# Patient Record
Sex: FEMALE | Race: WHITE | NOT HISPANIC OR LATINO
[De-identification: names, ages, dates, MRNs, and addresses within clinical notes are randomized per-mention and may not be internally consistent; named-entity substitution may affect disease eponyms.]

---

## 2019-01-15 ENCOUNTER — MEDICATION RENEWAL (OUTPATIENT)
Age: 79
End: 2019-01-15

## 2019-03-08 ENCOUNTER — RX RENEWAL (OUTPATIENT)
Age: 79
End: 2019-03-08

## 2019-06-04 ENCOUNTER — RECORD ABSTRACTING (OUTPATIENT)
Age: 79
End: 2019-06-04

## 2019-06-04 ENCOUNTER — LABORATORY RESULT (OUTPATIENT)
Age: 79
End: 2019-06-04

## 2019-06-04 ENCOUNTER — APPOINTMENT (OUTPATIENT)
Dept: INTERNAL MEDICINE | Facility: CLINIC | Age: 79
End: 2019-06-04
Payer: MEDICARE

## 2019-06-04 ENCOUNTER — NON-APPOINTMENT (OUTPATIENT)
Age: 79
End: 2019-06-04

## 2019-06-04 VITALS
SYSTOLIC BLOOD PRESSURE: 122 MMHG | WEIGHT: 130 LBS | BODY MASS INDEX: 27.29 KG/M2 | DIASTOLIC BLOOD PRESSURE: 77 MMHG | HEART RATE: 65 BPM | HEIGHT: 58 IN

## 2019-06-04 DIAGNOSIS — H92.09 OTALGIA, UNSPECIFIED EAR: ICD-10-CM

## 2019-06-04 DIAGNOSIS — T78.40XA ALLERGY, UNSPECIFIED, INITIAL ENCOUNTER: ICD-10-CM

## 2019-06-04 DIAGNOSIS — H61.20 IMPACTED CERUMEN, UNSPECIFIED EAR: ICD-10-CM

## 2019-06-04 DIAGNOSIS — Z82.61 FAMILY HISTORY OF ARTHRITIS: ICD-10-CM

## 2019-06-04 DIAGNOSIS — F42.9 OBSESSIVE-COMPULSIVE DISORDER, UNSPECIFIED: ICD-10-CM

## 2019-06-04 DIAGNOSIS — L28.2 OTHER PRURIGO: ICD-10-CM

## 2019-06-04 DIAGNOSIS — I34.0 NONRHEUMATIC MITRAL (VALVE) INSUFFICIENCY: ICD-10-CM

## 2019-06-04 DIAGNOSIS — Z92.89 PERSONAL HISTORY OF OTHER MEDICAL TREATMENT: ICD-10-CM

## 2019-06-04 DIAGNOSIS — Z79.899 OTHER LONG TERM (CURRENT) DRUG THERAPY: ICD-10-CM

## 2019-06-04 LAB
BILIRUB UR QL STRIP: NORMAL
CLARITY UR: CLEAR
COLLECTION METHOD: NORMAL
GLUCOSE UR-MCNC: NORMAL
HCG UR QL: 0.5 EU/DL
HGB UR QL STRIP.AUTO: NORMAL
KETONES UR-MCNC: NORMAL
LEUKOCYTE ESTERASE UR QL STRIP: NORMAL
NITRITE UR QL STRIP: NORMAL
PH UR STRIP: 6.5
PROT UR STRIP-MCNC: NORMAL
SP GR UR STRIP: 1.02

## 2019-06-04 PROCEDURE — 81003 URINALYSIS AUTO W/O SCOPE: CPT | Mod: QW

## 2019-06-04 PROCEDURE — 36415 COLL VENOUS BLD VENIPUNCTURE: CPT

## 2019-06-04 PROCEDURE — G0439: CPT

## 2019-06-04 PROCEDURE — 93000 ELECTROCARDIOGRAM COMPLETE: CPT

## 2019-06-04 RX ORDER — MULTIVITAMIN/IRON/FOLIC ACID 18MG-0.4MG
600-400 TABLET ORAL
Refills: 0 | Status: ACTIVE | COMMUNITY

## 2019-06-04 NOTE — COUNSELING
[Weight management counseling provided] : Weight management [Healthy eating counseling provided] : healthy eating [Activity counseling provided] : activity [de-identified] : patient counseled on    diet   importance of exercise and not smoking  regular dental care and periodic eye exams.  timing of when will be due for colonoscopy   discussed  regular  mammography and  regular gyn visits discussed\par

## 2019-06-04 NOTE — HEALTH RISK ASSESSMENT
[None] : None [Fully functional (using the telephone, shopping, preparing meals, housekeeping, doing laundry, using] : Fully functional and needs no help or supervision to perform IADLs (using the telephone, shopping, preparing meals, housekeeping, doing laundry, using transportation, managing medications and managing finances) [Fully functional (bathing, dressing, toileting, transferring, walking, feeding)] : Fully functional (bathing, dressing, toileting, transferring, walking, feeding) [No falls in past year] : Patient reported no falls in the past year [0] : 2) Feeling down, depressed, or hopeless: Not at all (0) [] : No [de-identified] : walks a lot [Reports changes in hearing] : Reports no changes in hearing [Reports changes in vision] : Reports no changes in vision [Reports changes in dental health] : Reports no changes in dental health [PapSmearDate] : 2018 [MammogramDate] : 2019 [BoneDensityDate] : 2019

## 2019-06-05 LAB
ALBUMIN SERPL ELPH-MCNC: 4.6 G/DL
ALP BLD-CCNC: 60 U/L
ALT SERPL-CCNC: 11 U/L
ANION GAP SERPL CALC-SCNC: 12 MMOL/L
AST SERPL-CCNC: 24 U/L
BASOPHILS # BLD AUTO: 0.17 K/UL
BASOPHILS NFR BLD AUTO: 1.7 %
BILIRUB SERPL-MCNC: 0.3 MG/DL
BUN SERPL-MCNC: 21 MG/DL
CALCIUM SERPL-MCNC: 9.8 MG/DL
CHLORIDE SERPL-SCNC: 105 MMOL/L
CHOLEST SERPL-MCNC: 163 MG/DL
CHOLEST/HDLC SERPL: 2.3 RATIO
CO2 SERPL-SCNC: 26 MMOL/L
CREAT SERPL-MCNC: 1 MG/DL
EOSINOPHIL # BLD AUTO: 0.26 K/UL
EOSINOPHIL NFR BLD AUTO: 2.6 %
ESTIMATED AVERAGE GLUCOSE: 128 MG/DL
GLUCOSE SERPL-MCNC: 94 MG/DL
HBA1C MFR BLD HPLC: 6.1 %
HCT VFR BLD CALC: 41 %
HDLC SERPL-MCNC: 71 MG/DL
HGB BLD-MCNC: 12.2 G/DL
LDLC SERPL CALC-MCNC: 74 MG/DL
LYMPHOCYTES # BLD AUTO: 6.17 K/UL
LYMPHOCYTES NFR BLD AUTO: 61.7 %
MAN DIFF?: NORMAL
MCHC RBC-ENTMCNC: 26.5 PG
MCHC RBC-ENTMCNC: 29.8 GM/DL
MCV RBC AUTO: 89.1 FL
MONOCYTES # BLD AUTO: 0.44 K/UL
MONOCYTES NFR BLD AUTO: 4.4 %
NEUTROPHILS # BLD AUTO: 2.96 K/UL
NEUTROPHILS NFR BLD AUTO: 29.6 %
PLATELET # BLD AUTO: 206 K/UL
POTASSIUM SERPL-SCNC: 5.4 MMOL/L
PROT SERPL-MCNC: 6.8 G/DL
RBC # BLD: 4.6 M/UL
RBC # FLD: 12.3 %
SODIUM SERPL-SCNC: 143 MMOL/L
TRIGL SERPL-MCNC: 90 MG/DL
WBC # FLD AUTO: 10 K/UL

## 2019-06-07 ENCOUNTER — APPOINTMENT (OUTPATIENT)
Dept: INTERNAL MEDICINE | Facility: CLINIC | Age: 79
End: 2019-06-07
Payer: MEDICARE

## 2019-06-07 LAB
DATE COLLECTED: NORMAL
HEMOCCULT SP1 STL QL: NEGATIVE
QUALITY CONTROL: YES

## 2019-06-07 PROCEDURE — 82270 OCCULT BLOOD FECES: CPT

## 2019-08-09 ENCOUNTER — MEDICATION RENEWAL (OUTPATIENT)
Age: 79
End: 2019-08-09

## 2019-11-15 ENCOUNTER — MEDICATION RENEWAL (OUTPATIENT)
Age: 79
End: 2019-11-15

## 2019-11-20 ENCOUNTER — RX RENEWAL (OUTPATIENT)
Age: 79
End: 2019-11-20

## 2020-04-07 ENCOUNTER — APPOINTMENT (OUTPATIENT)
Dept: INTERNAL MEDICINE | Facility: CLINIC | Age: 80
End: 2020-04-07
Payer: MEDICARE

## 2020-04-07 DIAGNOSIS — R21 RASH AND OTHER NONSPECIFIC SKIN ERUPTION: ICD-10-CM

## 2020-04-07 PROCEDURE — G2012 BRIEF CHECK IN BY MD/QHP: CPT

## 2020-04-07 RX ORDER — CLOTRIMAZOLE AND BETAMETHASONE DIPROPIONATE 10; .5 MG/G; MG/G
1-0.05 CREAM TOPICAL TWICE DAILY
Qty: 1 | Refills: 0 | Status: ACTIVE | COMMUNITY
Start: 2020-04-07 | End: 1900-01-01

## 2020-06-16 ENCOUNTER — APPOINTMENT (OUTPATIENT)
Dept: INTERNAL MEDICINE | Facility: CLINIC | Age: 80
End: 2020-06-16
Payer: MEDICARE

## 2020-06-16 ENCOUNTER — NON-APPOINTMENT (OUTPATIENT)
Age: 80
End: 2020-06-16

## 2020-06-16 VITALS
HEIGHT: 58 IN | DIASTOLIC BLOOD PRESSURE: 75 MMHG | WEIGHT: 134 LBS | OXYGEN SATURATION: 98 % | HEART RATE: 74 BPM | SYSTOLIC BLOOD PRESSURE: 136 MMHG | BODY MASS INDEX: 28.13 KG/M2

## 2020-06-16 DIAGNOSIS — R01.1 CARDIAC MURMUR, UNSPECIFIED: ICD-10-CM

## 2020-06-16 DIAGNOSIS — Z00.00 ENCOUNTER FOR GENERAL ADULT MEDICAL EXAMINATION W/OUT ABNORMAL FINDINGS: ICD-10-CM

## 2020-06-16 LAB
BILIRUB UR QL STRIP: NORMAL
CLARITY UR: CLEAR
COLLECTION METHOD: NORMAL
GLUCOSE UR-MCNC: NORMAL
HCG UR QL: 0.2 EU/DL
HGB UR QL STRIP.AUTO: NORMAL
KETONES UR-MCNC: NORMAL
LEUKOCYTE ESTERASE UR QL STRIP: NORMAL
NITRITE UR QL STRIP: NORMAL
PH UR STRIP: 6
PROT UR STRIP-MCNC: NORMAL
SP GR UR STRIP: 1.02

## 2020-06-16 PROCEDURE — 93000 ELECTROCARDIOGRAM COMPLETE: CPT | Mod: 59

## 2020-06-16 PROCEDURE — 81003 URINALYSIS AUTO W/O SCOPE: CPT | Mod: QW

## 2020-06-16 PROCEDURE — G0439: CPT

## 2020-06-16 PROCEDURE — G0444 DEPRESSION SCREEN ANNUAL: CPT

## 2020-06-16 PROCEDURE — 36415 COLL VENOUS BLD VENIPUNCTURE: CPT

## 2020-06-16 RX ORDER — PREDNISONE 20 MG/1
20 TABLET ORAL DAILY
Qty: 10 | Refills: 0 | Status: DISCONTINUED | COMMUNITY
Start: 2020-04-07 | End: 2020-06-16

## 2020-06-16 NOTE — HEALTH RISK ASSESSMENT
[Good] : ~his/her~  mood as  good [No] : No [No falls in past year] : Patient reported no falls in the past year [0] : 1) Little interest or pleasure doing things: Not at all (0) [Patient reported mammogram was normal] : Patient reported mammogram was normal [Patient reported PAP Smear was normal] : Patient reported PAP Smear was normal [] : No [MammogramDate] : 2019 [PapSmearDate] : 2019

## 2020-06-17 LAB
ALBUMIN SERPL ELPH-MCNC: 4.6 G/DL
ALP BLD-CCNC: 59 U/L
ALT SERPL-CCNC: 14 U/L
ANION GAP SERPL CALC-SCNC: 14 MMOL/L
AST SERPL-CCNC: 26 U/L
BASOPHILS # BLD AUTO: 0.13 K/UL
BASOPHILS NFR BLD AUTO: 1.4 %
BILIRUB SERPL-MCNC: 0.3 MG/DL
BUN SERPL-MCNC: 16 MG/DL
CALCIUM SERPL-MCNC: 9.8 MG/DL
CHLORIDE SERPL-SCNC: 102 MMOL/L
CHOLEST SERPL-MCNC: 178 MG/DL
CHOLEST/HDLC SERPL: 2.5 RATIO
CO2 SERPL-SCNC: 25 MMOL/L
CREAT SERPL-MCNC: 0.92 MG/DL
EOSINOPHIL # BLD AUTO: 0.24 K/UL
EOSINOPHIL NFR BLD AUTO: 2.6 %
ESTIMATED AVERAGE GLUCOSE: 131 MG/DL
GLUCOSE SERPL-MCNC: 100 MG/DL
HBA1C MFR BLD HPLC: 6.2 %
HCT VFR BLD CALC: 41 %
HDLC SERPL-MCNC: 72 MG/DL
HGB BLD-MCNC: 12.4 G/DL
IMM GRANULOCYTES NFR BLD AUTO: 0.3 %
LDLC SERPL CALC-MCNC: 79 MG/DL
LYMPHOCYTES # BLD AUTO: 5.38 K/UL
LYMPHOCYTES NFR BLD AUTO: 57.2 %
MAN DIFF?: NORMAL
MCHC RBC-ENTMCNC: 26.3 PG
MCHC RBC-ENTMCNC: 30.2 GM/DL
MCV RBC AUTO: 86.9 FL
MONOCYTES # BLD AUTO: 0.59 K/UL
MONOCYTES NFR BLD AUTO: 6.3 %
NEUTROPHILS # BLD AUTO: 3.03 K/UL
NEUTROPHILS NFR BLD AUTO: 32.2 %
PLATELET # BLD AUTO: 200 K/UL
POTASSIUM SERPL-SCNC: 4.7 MMOL/L
PROT SERPL-MCNC: 6.8 G/DL
RBC # BLD: 4.72 M/UL
RBC # FLD: 13 %
SODIUM SERPL-SCNC: 141 MMOL/L
TRIGL SERPL-MCNC: 129 MG/DL
WBC # FLD AUTO: 9.4 K/UL

## 2020-06-19 DIAGNOSIS — M79.652 PAIN IN LEFT THIGH: ICD-10-CM

## 2020-06-21 ENCOUNTER — RESULT CHARGE (OUTPATIENT)
Age: 80
End: 2020-06-21

## 2020-06-22 LAB
HEMOCCULT 2: NEGATIVE
HEMOCCULT 3: NEGATIVE
HEMOCCULT SP1 STL QL: NEGATIVE
QUALITY CONTROL: YES

## 2020-06-23 ENCOUNTER — APPOINTMENT (OUTPATIENT)
Dept: INTERNAL MEDICINE | Facility: CLINIC | Age: 80
End: 2020-06-23
Payer: MEDICARE

## 2020-06-23 VITALS
DIASTOLIC BLOOD PRESSURE: 78 MMHG | WEIGHT: 137 LBS | SYSTOLIC BLOOD PRESSURE: 132 MMHG | OXYGEN SATURATION: 98 % | TEMPERATURE: 98.3 F | BODY MASS INDEX: 28.63 KG/M2 | HEART RATE: 72 BPM

## 2020-06-23 DIAGNOSIS — R60.0 LOCALIZED EDEMA: ICD-10-CM

## 2020-06-23 PROCEDURE — 99214 OFFICE O/P EST MOD 30 MIN: CPT

## 2020-06-23 NOTE — PHYSICAL EXAM
[Normal Sclera/Conjunctiva] : normal sclera/conjunctiva [Normal] : no acute distress, well nourished, well developed and well-appearing [Normal Outer Ear/Nose] : the outer ears and nose were normal in appearance [No Respiratory Distress] : no respiratory distress  [No JVD] : no jugular venous distention [No Accessory Muscle Use] : no accessory muscle use [Clear to Auscultation] : lungs were clear to auscultation bilaterally [Normal Rate] : normal rate  [Normal S1, S2] : normal S1 and S2 [Regular Rhythm] : with a regular rhythm [Pedal Pulses Present] : the pedal pulses are present [de-identified] : as aabove [de-identified] : skin over feet is normal and not cold

## 2020-06-23 NOTE — HISTORY OF PRESENT ILLNESS
[FreeTextEntry1] : 4 days ago she twisted her torso getting out of bed and has severe pain in right lower leg and up into thigh and buttock. she go no sig relief from precocet and saw orthopedics who gave her a medrol pack but told her x rays were not revealing.  she has also noticed over the  last  few days that her ankle are puffy.  she denies any change in salt intak and ha no calf pain on either side  sh deneis sob or cp  shehas no recent travel or immobilization

## 2021-01-31 ENCOUNTER — RX RENEWAL (OUTPATIENT)
Age: 81
End: 2021-01-31

## 2021-05-05 ENCOUNTER — APPOINTMENT (OUTPATIENT)
Dept: INTERNAL MEDICINE | Facility: CLINIC | Age: 81
End: 2021-05-05
Payer: MEDICARE

## 2021-05-05 VITALS
TEMPERATURE: 97.6 F | BODY MASS INDEX: 29.26 KG/M2 | OXYGEN SATURATION: 98 % | WEIGHT: 140 LBS | DIASTOLIC BLOOD PRESSURE: 88 MMHG | HEART RATE: 70 BPM | SYSTOLIC BLOOD PRESSURE: 118 MMHG

## 2021-05-05 PROCEDURE — 99213 OFFICE O/P EST LOW 20 MIN: CPT

## 2021-05-05 PROCEDURE — 99072 ADDL SUPL MATRL&STAF TM PHE: CPT

## 2021-05-05 NOTE — HISTORY OF PRESENT ILLNESS
[FreeTextEntry1] : post hand surgery  high bp noted to  be high.  she says she feels fine  she is on statin  with no side effects  she had her vaccine she says she is trying  to watch her diet re her sugar

## 2021-05-05 NOTE — PHYSICAL EXAM
[Normal] : no acute distress, well nourished, well developed and well-appearing [Normal Sclera/Conjunctiva] : normal sclera/conjunctiva [No JVD] : no jugular venous distention [Supple] : supple [No Respiratory Distress] : no respiratory distress  [No Accessory Muscle Use] : no accessory muscle use [Clear to Auscultation] : lungs were clear to auscultation bilaterally [Normal Rate] : normal rate  [Regular Rhythm] : with a regular rhythm [Normal S1, S2] : normal S1 and S2

## 2021-06-18 ENCOUNTER — APPOINTMENT (OUTPATIENT)
Dept: INTERNAL MEDICINE | Facility: CLINIC | Age: 81
End: 2021-06-18
Payer: MEDICARE

## 2021-06-18 ENCOUNTER — NON-APPOINTMENT (OUTPATIENT)
Age: 81
End: 2021-06-18

## 2021-06-18 VITALS
HEIGHT: 58 IN | DIASTOLIC BLOOD PRESSURE: 68 MMHG | BODY MASS INDEX: 28.97 KG/M2 | OXYGEN SATURATION: 9 % | HEART RATE: 72 BPM | TEMPERATURE: 97.8 F | WEIGHT: 138 LBS | SYSTOLIC BLOOD PRESSURE: 130 MMHG

## 2021-06-18 DIAGNOSIS — R94.31 ABNORMAL ELECTROCARDIOGRAM [ECG] [EKG]: ICD-10-CM

## 2021-06-18 DIAGNOSIS — R31.29 OTHER MICROSCOPIC HEMATURIA: ICD-10-CM

## 2021-06-18 DIAGNOSIS — Z51.81 ENCOUNTER FOR THERAPEUTIC DRUG LVL MONITORING: ICD-10-CM

## 2021-06-18 LAB
BILIRUB UR QL STRIP: NORMAL
CLARITY UR: CLEAR
COLLECTION METHOD: NORMAL
GLUCOSE UR-MCNC: NORMAL
HCG UR QL: 0.2 EU/DL
HGB UR QL STRIP.AUTO: NORMAL
KETONES UR-MCNC: NORMAL
LEUKOCYTE ESTERASE UR QL STRIP: NORMAL
PH UR STRIP: 6
PROT UR STRIP-MCNC: NORMAL
SP GR UR STRIP: 1.02

## 2021-06-18 PROCEDURE — 93000 ELECTROCARDIOGRAM COMPLETE: CPT

## 2021-06-18 PROCEDURE — 36415 COLL VENOUS BLD VENIPUNCTURE: CPT

## 2021-06-18 PROCEDURE — 81003 URINALYSIS AUTO W/O SCOPE: CPT | Mod: QW

## 2021-06-18 PROCEDURE — G0439: CPT

## 2021-06-18 NOTE — HEALTH RISK ASSESSMENT
[Patient reported mammogram was normal] : Patient reported mammogram was normal [Patient reported PAP Smear was normal] : Patient reported PAP Smear was normal [MammogramDate] : 2020 [PapSmearDate] : 2020

## 2021-06-23 ENCOUNTER — MED ADMIN CHARGE (OUTPATIENT)
Age: 81
End: 2021-06-23

## 2021-06-23 ENCOUNTER — APPOINTMENT (OUTPATIENT)
Dept: CARDIOLOGY | Facility: CLINIC | Age: 81
End: 2021-06-23
Payer: MEDICARE

## 2021-06-23 PROCEDURE — A9500: CPT

## 2021-06-23 PROCEDURE — 93015 CV STRESS TEST SUPVJ I&R: CPT

## 2021-06-23 PROCEDURE — 78452 HT MUSCLE IMAGE SPECT MULT: CPT

## 2021-06-23 RX ORDER — REGADENOSON 0.08 MG/ML
0.4 INJECTION, SOLUTION INTRAVENOUS
Qty: 1 | Refills: 0 | Status: COMPLETED | OUTPATIENT
Start: 2021-06-23

## 2021-06-23 RX ADMIN — REGADENOSON 4 MG/5ML: 0.08 INJECTION, SOLUTION INTRAVENOUS at 00:00

## 2021-06-24 ENCOUNTER — RESULT CHARGE (OUTPATIENT)
Age: 81
End: 2021-06-24

## 2021-06-24 LAB
ALBUMIN SERPL ELPH-MCNC: 4.7 G/DL
ALP BLD-CCNC: 71 U/L
ALT SERPL-CCNC: 28 U/L
ANION GAP SERPL CALC-SCNC: 11 MMOL/L
APPEARANCE: CLEAR
AST SERPL-CCNC: 38 U/L
BACTERIA: NEGATIVE
BASOPHILS # BLD AUTO: 0.06 K/UL
BASOPHILS NFR BLD AUTO: 0.3 %
BILIRUB SERPL-MCNC: 0.2 MG/DL
BILIRUBIN URINE: NEGATIVE
BLOOD URINE: NEGATIVE
BUN SERPL-MCNC: 21 MG/DL
CALCIUM SERPL-MCNC: 10.6 MG/DL
CHLORIDE SERPL-SCNC: 106 MMOL/L
CHOLEST SERPL-MCNC: 220 MG/DL
CO2 SERPL-SCNC: 24 MMOL/L
COLOR: COLORLESS
CREAT SERPL-MCNC: 0.91 MG/DL
DATE COLLECTED: NORMAL
EOSINOPHIL # BLD AUTO: 0.17 K/UL
EOSINOPHIL NFR BLD AUTO: 1 %
ESTIMATED AVERAGE GLUCOSE: 140 MG/DL
GLUCOSE QUALITATIVE U: NEGATIVE
GLUCOSE SERPL-MCNC: 128 MG/DL
HBA1C MFR BLD HPLC: 6.5 %
HCT VFR BLD CALC: 39.2 %
HDLC SERPL-MCNC: 76 MG/DL
HEMOCCULT SP1 STL QL: NEGATIVE
HGB BLD-MCNC: 11.8 G/DL
HYALINE CASTS: 0 /LPF
IMM GRANULOCYTES NFR BLD AUTO: 0.4 %
KETONES URINE: NEGATIVE
LDLC SERPL CALC-MCNC: 130 MG/DL
LEUKOCYTE ESTERASE URINE: NEGATIVE
LYMPHOCYTES # BLD AUTO: 4.62 K/UL
LYMPHOCYTES NFR BLD AUTO: 26.9 %
MAN DIFF?: NORMAL
MCHC RBC-ENTMCNC: 27.1 PG
MCHC RBC-ENTMCNC: 30.1 GM/DL
MCV RBC AUTO: 90.1 FL
MICROSCOPIC-UA: NORMAL
MONOCYTES # BLD AUTO: 0.94 K/UL
MONOCYTES NFR BLD AUTO: 5.5 %
NEUTROPHILS # BLD AUTO: 11.29 K/UL
NEUTROPHILS NFR BLD AUTO: 65.9 %
NITRITE URINE: NEGATIVE
NONHDLC SERPL-MCNC: 145 MG/DL
PH URINE: 7.5
PLATELET # BLD AUTO: 264 K/UL
POTASSIUM SERPL-SCNC: 5.1 MMOL/L
PROT SERPL-MCNC: 6.8 G/DL
PROTEIN URINE: NEGATIVE
QUALITY CONTROL: YES
RBC # BLD: 4.35 M/UL
RBC # FLD: 13.8 %
RED BLOOD CELLS URINE: 2 /HPF
SODIUM SERPL-SCNC: 140 MMOL/L
SPECIFIC GRAVITY URINE: 1.01
SQUAMOUS EPITHELIAL CELLS: 0 /HPF
TRIGL SERPL-MCNC: 72 MG/DL
UROBILINOGEN URINE: NORMAL
WBC # FLD AUTO: 17.15 K/UL
WHITE BLOOD CELLS URINE: 0 /HPF

## 2021-07-26 ENCOUNTER — RX RENEWAL (OUTPATIENT)
Age: 81
End: 2021-07-26

## 2021-10-23 DIAGNOSIS — Z01.818 ENCOUNTER FOR OTHER PREPROCEDURAL EXAMINATION: ICD-10-CM

## 2021-10-25 ENCOUNTER — APPOINTMENT (OUTPATIENT)
Dept: DISASTER EMERGENCY | Facility: CLINIC | Age: 81
End: 2021-10-25

## 2021-10-26 LAB — SARS-COV-2 N GENE NPH QL NAA+PROBE: NOT DETECTED

## 2021-11-16 ENCOUNTER — LABORATORY RESULT (OUTPATIENT)
Age: 81
End: 2021-11-16

## 2021-11-16 ENCOUNTER — APPOINTMENT (OUTPATIENT)
Dept: INTERNAL MEDICINE | Facility: CLINIC | Age: 81
End: 2021-11-16
Payer: MEDICARE

## 2021-11-16 VITALS
TEMPERATURE: 97.8 F | WEIGHT: 133 LBS | HEART RATE: 73 BPM | OXYGEN SATURATION: 98 % | DIASTOLIC BLOOD PRESSURE: 73 MMHG | HEIGHT: 58 IN | SYSTOLIC BLOOD PRESSURE: 151 MMHG | BODY MASS INDEX: 27.92 KG/M2

## 2021-11-16 VITALS — SYSTOLIC BLOOD PRESSURE: 124 MMHG | DIASTOLIC BLOOD PRESSURE: 80 MMHG

## 2021-11-16 DIAGNOSIS — D72.829 ELEVATED WHITE BLOOD CELL COUNT, UNSPECIFIED: ICD-10-CM

## 2021-11-16 LAB
ALBUMIN SERPL ELPH-MCNC: 4.4 G/DL
ALP BLD-CCNC: 59 U/L
ALT SERPL-CCNC: 10 U/L
ANION GAP SERPL CALC-SCNC: 13 MMOL/L
AST SERPL-CCNC: 17 U/L
BILIRUB SERPL-MCNC: 0.3 MG/DL
BUN SERPL-MCNC: 22 MG/DL
CALCIUM SERPL-MCNC: 9.6 MG/DL
CHLORIDE SERPL-SCNC: 106 MMOL/L
CHOLEST SERPL-MCNC: 173 MG/DL
CO2 SERPL-SCNC: 23 MMOL/L
CREAT SERPL-MCNC: 0.86 MG/DL
ESTIMATED AVERAGE GLUCOSE: 131 MG/DL
GLUCOSE SERPL-MCNC: 94 MG/DL
HBA1C MFR BLD HPLC: 6.2 %
HDLC SERPL-MCNC: 88 MG/DL
LDLC SERPL CALC-MCNC: 73 MG/DL
NONHDLC SERPL-MCNC: 85 MG/DL
POTASSIUM SERPL-SCNC: 4.7 MMOL/L
PROT SERPL-MCNC: 6.2 G/DL
SODIUM SERPL-SCNC: 142 MMOL/L
TRIGL SERPL-MCNC: 60 MG/DL

## 2021-11-16 PROCEDURE — 99214 OFFICE O/P EST MOD 30 MIN: CPT

## 2021-11-16 NOTE — HISTORY OF PRESENT ILLNESS
[FreeTextEntry1] : a lot of stress  she says her bp has been up and down. she is on rx only for hld  had  high a1c last time and has continued to get the steroid injections and her diet  re sugar has been   variable due to the stress.

## 2021-11-16 NOTE — PHYSICAL EXAM
[Normal] : no acute distress, well nourished, well developed and well-appearing [Normal Sclera/Conjunctiva] : normal sclera/conjunctiva [Normal Outer Ear/Nose] : the outer ears and nose were normal in appearance [No JVD] : no jugular venous distention [No Respiratory Distress] : no respiratory distress  [No Accessory Muscle Use] : no accessory muscle use [Clear to Auscultation] : lungs were clear to auscultation bilaterally [Normal Rate] : normal rate  [Regular Rhythm] : with a regular rhythm [Normal S1, S2] : normal S1 and S2 [No Edema] : there was no peripheral edema [Soft] : abdomen soft [Non Tender] : non-tender [No HSM] : no HSM [Normal Bowel Sounds] : normal bowel sounds

## 2021-11-17 LAB
BASOPHILS # BLD AUTO: 0.19 K/UL
BASOPHILS NFR BLD AUTO: 1.7 %
EOSINOPHIL # BLD AUTO: 0.76 K/UL
EOSINOPHIL NFR BLD AUTO: 7 %
HCT VFR BLD CALC: 40.8 %
HGB BLD-MCNC: 12.5 G/DL
LYMPHOCYTES # BLD AUTO: 5.78 K/UL
LYMPHOCYTES NFR BLD AUTO: 53 %
MAN DIFF?: NORMAL
MCHC RBC-ENTMCNC: 27 PG
MCHC RBC-ENTMCNC: 30.6 GM/DL
MCV RBC AUTO: 88.1 FL
MONOCYTES # BLD AUTO: 0.48 K/UL
MONOCYTES NFR BLD AUTO: 4.4 %
NEUTROPHILS # BLD AUTO: 3.7 K/UL
NEUTROPHILS NFR BLD AUTO: 33.9 %
PLATELET # BLD AUTO: 230 K/UL
RBC # BLD: 4.63 M/UL
RBC # FLD: 13.2 %
WBC # FLD AUTO: 10.91 K/UL

## 2021-12-15 ENCOUNTER — TRANSCRIPTION ENCOUNTER (OUTPATIENT)
Age: 81
End: 2021-12-15

## 2022-01-01 ENCOUNTER — TRANSCRIPTION ENCOUNTER (OUTPATIENT)
Age: 82
End: 2022-01-01

## 2022-01-31 ENCOUNTER — TRANSCRIPTION ENCOUNTER (OUTPATIENT)
Age: 82
End: 2022-01-31

## 2022-04-20 ENCOUNTER — NON-APPOINTMENT (OUTPATIENT)
Age: 82
End: 2022-04-20

## 2022-04-22 ENCOUNTER — APPOINTMENT (OUTPATIENT)
Dept: PAIN MANAGEMENT | Facility: CLINIC | Age: 82
End: 2022-04-22
Payer: MEDICARE

## 2022-04-22 VITALS — BODY MASS INDEX: 29.39 KG/M2 | WEIGHT: 140 LBS | HEIGHT: 58 IN

## 2022-04-22 PROCEDURE — 99214 OFFICE O/P EST MOD 30 MIN: CPT

## 2022-04-22 NOTE — ASSESSMENT
[FreeTextEntry1] : After discussing various treatment options with the patient including but not limited to oral medications, physical therapy, exercise, modalities as well as interventional spinal injections, we have decided with the following plan:\par \par I would recommend a trial of neuropathic medication as patient presents with signs of nerve irritation. (ie burning, paresthesias etc) Goals of therapy would be to improve pain and overall QOL. Side effects reviewed with patient. Patient will call or stop medication if given side effects occur.\par \par Continue active and healthy lifestyle. Encourage ambulation minimum of 30 minutes per day.\par

## 2022-04-22 NOTE — PHYSICAL EXAM
[Normal Mood and Affect] : normal mood and affect [Orientated] : orientated [Able to Communicate] : able to communicate [No Respiratory Distress] : no respiratory distress [] : diminished ROM in all planes [4___] : right hip flexion 4[unfilled]/5 [FreeTextEntry8] : very TTP

## 2022-04-22 NOTE — HISTORY OF PRESENT ILLNESS
[Lower back] : lower back [10] : 10 [7] : 7 [Sharp] : sharp [Shooting] : shooting [Tingling] : tingling [Constant] : constant [Nothing helps with pain getting better] : Nothing helps with pain getting better [Sitting] : sitting [Standing] : standing [Walking] : walking [Bending forward] : bending forward [Stairs] : stairs [FreeTextEntry1] : 04/22/2022: follow up today. She is having increased pain i the lower back with radiation down the legs. Allergic to  aspirin. Medrol in January gave her mild relief. Got nauseous from tramadol \par \par 2/18/22: follow up today after b/l L5/S1 on 2/3/22. Had 80% relief. Had 4 injections in 1 year so will have to wait until June.\par \par 12/31/21: follow up today. Pain in the lower back with tingling down the bilateral legs. She is going away next week. She had a dull ache last month and the pain got worse.\par \par 11/12/21- Patient had 60% relief from TFESI. Does not want medication. Has 1 injection left until next June.\par \par 8/27/21- patient had 90% improvement from GABBI.\par \par 7/2/21: follow up today after b/l L5/S1 TFESI on 6/17/21. she reports >90% relief following.\par \par 6/4/21- Patient states last June she jumped out of bed and felt pain right away. She had seen Dr. Cox who\par recommended TFESI. She felt better without injection. Pain is in b/l low back radiating down both legs. No PT and just taking Tylenol. Gets n/t down the posterior legs. Tato any weakness in the legs. She reports pain down the posterior bilateral legs to the ankles.\par MRI lumbar spine (5/21/20) T12/L1 moderate to severe spondylosis, L1/2 moderate b/l NF stenosis, L3/4 severe FA and severe central stenosis, L4/5 severe b/l FA, L5/S1 severe left FA. [] : no [FreeTextEntry7] : both legs

## 2022-04-24 ENCOUNTER — NON-APPOINTMENT (OUTPATIENT)
Age: 82
End: 2022-04-24

## 2022-05-18 ENCOUNTER — NON-APPOINTMENT (OUTPATIENT)
Age: 82
End: 2022-05-18

## 2022-05-19 DIAGNOSIS — Z11.59 ENCOUNTER FOR SCREENING FOR OTHER VIRAL DISEASES: ICD-10-CM

## 2022-05-24 ENCOUNTER — NON-APPOINTMENT (OUTPATIENT)
Age: 82
End: 2022-05-24

## 2022-06-03 ENCOUNTER — APPOINTMENT (OUTPATIENT)
Dept: PAIN MANAGEMENT | Facility: CLINIC | Age: 82
End: 2022-06-03
Payer: MEDICARE

## 2022-06-03 VITALS — BODY MASS INDEX: 28.97 KG/M2 | HEIGHT: 58 IN | WEIGHT: 138 LBS

## 2022-06-03 PROCEDURE — 99214 OFFICE O/P EST MOD 30 MIN: CPT

## 2022-06-14 NOTE — ASSESSMENT
[FreeTextEntry1] : \par Patient is presenting with acute/sub-acute radicular pain with impairment in ADLs and functionality.  The pain has not responded to  conservative care including nsaid therapy and/or physical therapy.  There is no bleeding tendency, unstable medical condition, or systemic infection.\par

## 2022-06-14 NOTE — HISTORY OF PRESENT ILLNESS
[Lower back] : lower back [10] : 10 [7] : 7 [Sharp] : sharp [Shooting] : shooting [Tingling] : tingling [Constant] : constant [Nothing helps with pain getting better] : Nothing helps with pain getting better [Sitting] : sitting [Standing] : standing [Walking] : walking [Bending forward] : bending forward [Stairs] : stairs [Stabbing] : stabbing [Household chores] : household chores [Retired] : Work status: retired [FreeTextEntry1] : 06/03/2022: follow up today.  She is off gabapentin due to symptoms.  She can have another injection after 6/17.  will schedule B/L L5-S1 TFESI.\par \par 04/22/2022: follow up today. She is having increased pain in the lower back with radiation down the legs. Allergic to  aspirin. Medrol in January gave her mild relief. Got nauseous from tramadol \par \par 2/18/22: follow up today after b/l L5/S1 on 2/3/22. Had 80% relief. Had 4 injections in 1 year so will have to wait until June.\par \par 12/31/21: follow up today. Pain in the lower back with tingling down the bilateral legs. She is going away next week. She had a dull ache last month and the pain got worse.\par \par 11/12/21- Patient had 60% relief from TFESI. Does not want medication. Has 1 injection left until next June.\par \par 8/27/21- patient had 90% improvement from GABBI.\par \par 7/2/21: follow up today after b/l L5/S1 TFESI on 6/17/21. she reports >90% relief following.\par \par 6/4/21- Patient states last June she jumped out of bed and felt pain right away. She had seen Dr. Cox who\par recommended TFESI. She felt better without injection. Pain is in b/l low back radiating down both legs. No PT and just taking Tylenol. Gets n/t down the posterior legs. Tato any weakness in the legs. She reports pain down the posterior bilateral legs to the ankles.\par \par MRI lumbar spine (5/21/20) T12/L1 moderate to severe spondylosis, L1/2 moderate b/l NF stenosis, L3/4 severe FA and severe central stenosis, L4/5 severe b/l FA, L5/S1 severe left FA. [] : no [FreeTextEntry7] : both legs  [de-identified] : mri l spine

## 2022-06-18 ENCOUNTER — NON-APPOINTMENT (OUTPATIENT)
Age: 82
End: 2022-06-18

## 2022-06-23 ENCOUNTER — APPOINTMENT (OUTPATIENT)
Age: 82
End: 2022-06-23
Payer: MEDICARE

## 2022-06-23 PROCEDURE — 64483 NJX AA&/STRD TFRM EPI L/S 1: CPT | Mod: 50

## 2022-07-08 ENCOUNTER — APPOINTMENT (OUTPATIENT)
Dept: PAIN MANAGEMENT | Facility: CLINIC | Age: 82
End: 2022-07-08

## 2022-07-08 VITALS — HEIGHT: 58 IN | WEIGHT: 139 LBS | BODY MASS INDEX: 29.18 KG/M2

## 2022-07-08 PROCEDURE — 99214 OFFICE O/P EST MOD 30 MIN: CPT

## 2022-07-08 NOTE — HISTORY OF PRESENT ILLNESS
[Lower back] : lower back [Sharp] : sharp [Shooting] : shooting [Tingling] : tingling [Constant] : constant [Household chores] : household chores [Nothing helps with pain getting better] : Nothing helps with pain getting better [Sitting] : sitting [Standing] : standing [Walking] : walking [Bending forward] : bending forward [Stairs] : stairs [Retired] : Work status: retired [Leisure] : leisure [10] : 10 [7] : 7 [FreeTextEntry1] : 04/22/2022: follow up today. She is having increased pain i the lower back with radiation down the legs. Allergic to  aspirin. Medrol in January gave her mild relief. Got nauseous from tramadol \par \par 2/18/22: follow up today after b/l L5/S1 on 2/3/22. Had 80% relief. Had 4 injections in 1 year so will have to wait until June.\par \par 12/31/21: follow up today. Pain in the lower back with tingling down the bilateral legs. She is going away next week. She had a dull ache last month and the pain got worse.\par \par 11/12/21- Patient had 60% relief from TFESI. Does not want medication. Has 1 injection left until next June.\par \par 8/27/21- patient had 90% improvement from GABBI.\par \par 7/2/21: follow up today after b/l L5/S1 TFESI on 6/17/21. she reports >90% relief following.\par \par 6/4/21- Patient states last June she jumped out of bed and felt pain right away. She had seen Dr. Cox who\par recommended TFESI. She felt better without injection. Pain is in b/l low back radiating down both legs. No PT and just taking Tylenol. Gets n/t down the posterior legs. Tato any weakness in the legs. She reports pain down the posterior bilateral legs to the ankles.\par MRI lumbar spine (5/21/20) T12/L1 moderate to severe spondylosis, L1/2 moderate b/l NF stenosis, L3/4 severe FA and severe central stenosis, L4/5 severe b/l FA, L5/S1 severe left FA. [] : no [FreeTextEntry7] : both legs  [de-identified] : mri l spine

## 2022-07-08 NOTE — ASSESSMENT
LMOM for pt to return call to [place].  Uyen Mcneill, KAMARI       [FreeTextEntry1] : After discussing various treatment options with the patient including but not limited to oral medications, physical therapy, exercise, modalities as well as interventional spinal injections, we have decided with the following plan:\par \par I would recommend a trial of neuropathic medication as patient presents with signs of nerve irritation. (ie burning, paresthesias etc) Goals of therapy would be to improve pain and overall QOL. Side effects reviewed with patient. Patient will call or stop medication if given side effects occur.\par \par Continue active and healthy lifestyle. Encourage ambulation minimum of 30 minutes per day.\par \par Patient presents with axial lumbar pain that has not responded to  3 months of conservative therapy including physical therapy or NSAID therapy.  The pain is interfering with activities of daily living and functionality.   There is no radicular pain.   The pain is exacerbated by facet loading.  Positive Kemps maneuver which is defined by pain reproduction with extension and rotation of the lumbar spine to the affected side.  The patient has not had a vertebral fusion at the levels of the proposed treatment.  There is no unexplained neurologic deficit.  There is no history of systemic infection, unstable medical condition, bleeding tendency, or local infection.  The injection is being performed to diagnose the facet joint as the source of the individual's pain.\par \par

## 2022-07-08 NOTE — PHYSICAL EXAM
[Normal Mood and Affect] : normal mood and affect [Orientated] : orientated [Able to Communicate] : able to communicate [No Respiratory Distress] : no respiratory distress [4___] : right hip flexion 4[unfilled]/5 [] : no palpable masses [FreeTextEntry8] : very TTP

## 2022-07-15 ENCOUNTER — RX RENEWAL (OUTPATIENT)
Age: 82
End: 2022-07-15

## 2022-07-22 ENCOUNTER — NON-APPOINTMENT (OUTPATIENT)
Age: 82
End: 2022-07-22

## 2022-07-28 ENCOUNTER — APPOINTMENT (OUTPATIENT)
Age: 82
End: 2022-07-28

## 2022-07-28 PROCEDURE — 64493 INJ PARAVERT F JNT L/S 1 LEV: CPT | Mod: 50

## 2022-07-28 PROCEDURE — 64494 INJ PARAVERT F JNT L/S 2 LEV: CPT | Mod: 50

## 2022-07-28 PROCEDURE — J3490M: CUSTOM

## 2022-08-11 ENCOUNTER — LABORATORY RESULT (OUTPATIENT)
Age: 82
End: 2022-08-11

## 2022-08-11 ENCOUNTER — APPOINTMENT (OUTPATIENT)
Dept: INTERNAL MEDICINE | Facility: CLINIC | Age: 82
End: 2022-08-11

## 2022-08-11 VITALS — SYSTOLIC BLOOD PRESSURE: 118 MMHG | DIASTOLIC BLOOD PRESSURE: 78 MMHG

## 2022-08-11 VITALS
HEIGHT: 58 IN | BODY MASS INDEX: 26.87 KG/M2 | WEIGHT: 128 LBS | HEART RATE: 68 BPM | TEMPERATURE: 97.8 F | OXYGEN SATURATION: 98 %

## 2022-08-11 DIAGNOSIS — R73.9 HYPERGLYCEMIA, UNSPECIFIED: ICD-10-CM

## 2022-08-11 DIAGNOSIS — Z23 ENCOUNTER FOR IMMUNIZATION: ICD-10-CM

## 2022-08-11 DIAGNOSIS — E78.00 PURE HYPERCHOLESTEROLEMIA, UNSPECIFIED: ICD-10-CM

## 2022-08-11 LAB
BILIRUB UR QL STRIP: NEGATIVE
CLARITY UR: CLEAR
COLLECTION METHOD: NORMAL
GLUCOSE UR-MCNC: NEGATIVE
HCG UR QL: 0.2 EU/DL
HGB UR QL STRIP.AUTO: NORMAL
KETONES UR-MCNC: NEGATIVE
LEUKOCYTE ESTERASE UR QL STRIP: NORMAL
NITRITE UR QL STRIP: NEGATIVE
PH UR STRIP: 5.5
PROT UR STRIP-MCNC: NEGATIVE
SP GR UR STRIP: 1.02

## 2022-08-11 PROCEDURE — G0439: CPT

## 2022-08-11 PROCEDURE — 81003 URINALYSIS AUTO W/O SCOPE: CPT | Mod: QW

## 2022-08-11 PROCEDURE — 36415 COLL VENOUS BLD VENIPUNCTURE: CPT

## 2022-08-11 RX ORDER — ACETAMINOPHEN AND CODEINE 300; 30 MG/1; MG/1
300-30 TABLET ORAL
Refills: 0 | Status: DISCONTINUED | COMMUNITY
End: 2022-08-11

## 2022-08-11 RX ORDER — OXYCODONE HYDROCHLORIDE AND ACETAMINOPHEN 5; 325 MG/1; MG/1
5-325 TABLET ORAL
Refills: 0 | Status: DISCONTINUED | COMMUNITY
End: 2022-08-11

## 2022-08-11 RX ORDER — TRAMADOL HYDROCHLORIDE 50 MG/1
50 TABLET, COATED ORAL
Refills: 0 | Status: DISCONTINUED | COMMUNITY
End: 2022-08-11

## 2022-08-11 RX ORDER — METHYLPREDNISOLONE 4 MG/1
4 TABLET ORAL
Refills: 0 | Status: DISCONTINUED | COMMUNITY
End: 2022-08-11

## 2022-08-11 RX ORDER — CYCLOBENZAPRINE HYDROCHLORIDE 5 MG/1
5 TABLET, FILM COATED ORAL
Refills: 0 | Status: DISCONTINUED | COMMUNITY
End: 2022-08-11

## 2022-08-11 RX ORDER — GABAPENTIN 100 MG/1
100 CAPSULE ORAL AS DIRECTED
Qty: 90 | Refills: 0 | Status: DISCONTINUED | COMMUNITY
Start: 2022-04-22 | End: 2022-08-11

## 2022-08-11 RX ORDER — OXYCODONE AND ACETAMINOPHEN 5; 325 MG/1; MG/1
5-325 TABLET ORAL
Qty: 80 | Refills: 0 | Status: DISCONTINUED | COMMUNITY
Start: 2020-06-19 | End: 2022-08-11

## 2022-08-11 NOTE — HISTORY OF PRESENT ILLNESS
[FreeTextEntry1] : had  epidural for sciatica.bad reaction  to gabapentin.  had covid and took paxlovid

## 2022-08-11 NOTE — HEALTH RISK ASSESSMENT
[Good] : ~his/her~  mood as  good [Patient reported mammogram was normal] : Patient reported mammogram was normal [Patient reported PAP Smear was normal] : Patient reported PAP Smear was normal [Never] : Never [No] : In the past 12 months have you used drugs other than those required for medical reasons? No [No falls in past year] : Patient reported no falls in the past year [Fully functional (bathing, dressing, toileting, transferring, walking, feeding)] : Fully functional (bathing, dressing, toileting, transferring, walking, feeding) [Fully functional (using the telephone, shopping, preparing meals, housekeeping, doing laundry, using] : Fully functional and needs no help or supervision to perform IADLs (using the telephone, shopping, preparing meals, housekeeping, doing laundry, using transportation, managing medications and managing finances) [Reports changes in hearing] : Reports no changes in hearing [Reports changes in vision] : Reports no changes in vision [Reports changes in dental health] : Reports no changes in dental health [MammogramDate] : 2022 [PapSmearDate] : 2022 [BoneDensityDate] : 2021 [BoneDensityComments] : unsure of result

## 2022-08-12 ENCOUNTER — APPOINTMENT (OUTPATIENT)
Dept: PAIN MANAGEMENT | Facility: CLINIC | Age: 82
End: 2022-08-12

## 2022-08-12 VITALS — WEIGHT: 131 LBS | HEIGHT: 58 IN | BODY MASS INDEX: 27.5 KG/M2

## 2022-08-12 PROCEDURE — 99214 OFFICE O/P EST MOD 30 MIN: CPT

## 2022-08-12 NOTE — ASSESSMENT
[FreeTextEntry1] : After discussing various treatment options with the patient including but not limited to oral medications, physical therapy, exercise, modalities as well as interventional spinal injections, we have decided with the following plan:\par \par I would recommend a trial of neuropathic medication as patient presents with signs of nerve irritation. (ie burning, paresthesias etc) Goals of therapy would be to improve pain and overall QOL. Side effects reviewed with patient. Patient will call or stop medication if given side effects occur.\par \par Continue active and healthy lifestyle. Encourage ambulation minimum of 30 minutes per day.\par \par Patient presents with axial lumbar pain that has not responded to  3 months of conservative therapy including physical therapy or NSAID therapy.  The pain is interfering with activities of daily living and functionality.   There is no radicular pain.   The pain is exacerbated by facet loading.  Positive Kemps maneuver which is defined by pain reproduction with extension and rotation of the lumbar spine to the affected side.  The patient has not had a vertebral fusion at the levels of the proposed treatment.  There is no unexplained neurologic deficit.  There is no history of systemic infection, unstable medical condition, bleeding tendency, or local infection.  The injection is being performed to diagnose the facet joint as the source of the individual's pain.\par \par

## 2022-08-12 NOTE — HISTORY OF PRESENT ILLNESS
[Lower back] : lower back [Sharp] : sharp [Shooting] : shooting [Tingling] : tingling [Constant] : constant [Household chores] : household chores [Leisure] : leisure [Sitting] : sitting [Standing] : standing [Walking] : walking [Bending forward] : bending forward [Stairs] : stairs [Retired] : Work status: retired [7] : 7 [5] : 5 [FreeTextEntry1] : 08/12/2022: follow up today after b/l lumbar MBB on 7/28/22.  Had 80% relief 1 week later.  Will repeat MBB.\par \par 7/8/22- Patient here for follow up.  Still in pain.  would recommend MBB. \par \par 04/22/2022: follow up today. She is having increased pain i the lower back with radiation down the legs. Allergic to  aspirin. Medrol in January gave her mild relief. Got nauseous from tramadol \par \par 2/18/22: follow up today after b/l L5/S1 on 2/3/22. Had 80% relief. Had 4 injections in 1 year so will have to wait until June.\par \par 12/31/21: follow up today. Pain in the lower back with tingling down the bilateral legs. She is going away next week. She had a dull ache last month and the pain got worse.\par \par 11/12/21- Patient had 60% relief from TFESI. Does not want medication. Has 1 injection left until next June.\par \par 8/27/21- patient had 90% improvement from GABBI.\par \par 7/2/21: follow up today after b/l L5/S1 TFESI on 6/17/21. she reports >90% relief following.\par \par 6/4/21- Patient states last June she jumped out of bed and felt pain right away. She had seen Dr. Cox who\par recommended TFESI. She felt better without injection. Pain is in b/l low back radiating down both legs. No PT and just taking Tylenol. Gets n/t down the posterior legs. Tato any weakness in the legs. She reports pain down the posterior bilateral legs to the ankles.\par MRI lumbar spine (5/21/20) T12/L1 moderate to severe spondylosis, L1/2 moderate b/l NF stenosis, L3/4 severe FA and severe central stenosis, L4/5 severe b/l FA, L5/S1 severe left FA. [] : no [FreeTextEntry7] : both legs  [FreeTextEntry9] : Tylenol  [de-identified] : mri l spine

## 2022-08-15 LAB
ALBUMIN SERPL ELPH-MCNC: 4.5 G/DL
ALP BLD-CCNC: 55 U/L
ALT SERPL-CCNC: 12 U/L
ANION GAP SERPL CALC-SCNC: 10 MMOL/L
APPEARANCE: CLEAR
AST SERPL-CCNC: 13 U/L
BACTERIA: NEGATIVE
BASOPHILS # BLD AUTO: 0.13 K/UL
BASOPHILS NFR BLD AUTO: 0.9 %
BILIRUB SERPL-MCNC: 0.3 MG/DL
BILIRUBIN URINE: NEGATIVE
BLOOD URINE: NORMAL
BUN SERPL-MCNC: 19 MG/DL
CALCIUM SERPL-MCNC: 10.2 MG/DL
CHLORIDE SERPL-SCNC: 108 MMOL/L
CHOLEST SERPL-MCNC: 196 MG/DL
CO2 SERPL-SCNC: 26 MMOL/L
COLOR: COLORLESS
CREAT SERPL-MCNC: 0.92 MG/DL
EGFR: 62 ML/MIN/1.73M2
EOSINOPHIL # BLD AUTO: 0.41 K/UL
EOSINOPHIL NFR BLD AUTO: 2.9 %
ESTIMATED AVERAGE GLUCOSE: 137 MG/DL
GLUCOSE QUALITATIVE U: NEGATIVE
GLUCOSE SERPL-MCNC: 100 MG/DL
HBA1C MFR BLD HPLC: 6.4 %
HCT VFR BLD CALC: 40.5 %
HDLC SERPL-MCNC: 82 MG/DL
HGB BLD-MCNC: 12.2 G/DL
HYALINE CASTS: 0 /LPF
IMM GRANULOCYTES NFR BLD AUTO: 0.6 %
KETONES URINE: NEGATIVE
LDLC SERPL CALC-MCNC: 88 MG/DL
LEUKOCYTE ESTERASE URINE: ABNORMAL
LYMPHOCYTES # BLD AUTO: 5.96 K/UL
LYMPHOCYTES NFR BLD AUTO: 42.5 %
MAN DIFF?: NORMAL
MCHC RBC-ENTMCNC: 26.5 PG
MCHC RBC-ENTMCNC: 30.1 GM/DL
MCV RBC AUTO: 88 FL
MICROSCOPIC-UA: NORMAL
MONOCYTES # BLD AUTO: 0.89 K/UL
MONOCYTES NFR BLD AUTO: 6.3 %
NEUTROPHILS # BLD AUTO: 6.57 K/UL
NEUTROPHILS NFR BLD AUTO: 46.8 %
NITRITE URINE: NEGATIVE
NONHDLC SERPL-MCNC: 114 MG/DL
PH URINE: 6
PLATELET # BLD AUTO: 268 K/UL
POTASSIUM SERPL-SCNC: 5 MMOL/L
PROT SERPL-MCNC: 6.7 G/DL
PROTEIN URINE: NEGATIVE
RBC # BLD: 4.6 M/UL
RBC # FLD: 14.1 %
RED BLOOD CELLS URINE: 0 /HPF
SODIUM SERPL-SCNC: 144 MMOL/L
SPECIFIC GRAVITY URINE: 1.01
SQUAMOUS EPITHELIAL CELLS: 0 /HPF
TRIGL SERPL-MCNC: 127 MG/DL
UROBILINOGEN URINE: NORMAL
WBC # FLD AUTO: 14.04 K/UL
WHITE BLOOD CELLS URINE: 2 /HPF

## 2022-08-26 ENCOUNTER — NON-APPOINTMENT (OUTPATIENT)
Age: 82
End: 2022-08-26

## 2022-09-01 ENCOUNTER — APPOINTMENT (OUTPATIENT)
Age: 82
End: 2022-09-01

## 2022-09-01 PROCEDURE — 64493 INJ PARAVERT F JNT L/S 1 LEV: CPT | Mod: 50

## 2022-09-01 PROCEDURE — 64494 INJ PARAVERT F JNT L/S 2 LEV: CPT | Mod: RT

## 2022-09-16 ENCOUNTER — APPOINTMENT (OUTPATIENT)
Dept: PAIN MANAGEMENT | Facility: CLINIC | Age: 82
End: 2022-09-16

## 2022-09-16 VITALS — BODY MASS INDEX: 27.5 KG/M2 | WEIGHT: 131 LBS | HEIGHT: 58 IN

## 2022-09-16 PROCEDURE — 99214 OFFICE O/P EST MOD 30 MIN: CPT

## 2022-09-16 NOTE — DATA REVIEWED
[MRI] : MRI [Report was reviewed and noted in the chart] : The report was reviewed and noted in the chart [I reviewed the films/CD and agree] : I reviewed the films/CD and agree

## 2022-09-16 NOTE — HISTORY OF PRESENT ILLNESS
[Lower back] : lower back [Sharp] : sharp [Sitting] : sitting [Standing] : standing [Walking] : walking [Bending forward] : bending forward [Stairs] : stairs [Retired] : Work status: retired [4] : 4 [3] : 3 [Dull/Aching] : dull/aching [Radiating] : radiating [Throbbing] : throbbing [Intermittent] : intermittent [Rest] : rest [Meds] : meds [Injection therapy] : injection therapy [FreeTextEntry1] : 09/16/2022: follow up today after MBB on 9/1/22.  Denies much relief from the injection. Still able to work. Pain does limit her activity here and there. \par \par 08/12/2022: follow up today after b/l lumbar MBB on 7/28/22.  Had 80% relief 1 week later.  Will repeat MBB.\par \par 7/8/22- Patient here for follow up.  Still in pain.  would recommend MBB. \par \par 04/22/2022: follow up today. She is having increased pain i the lower back with radiation down the legs. Allergic to  aspirin. Medrol in January gave her mild relief. Got nauseous from tramadol \par \par 2/18/22: follow up today after b/l L5/S1 on 2/3/22. Had 80% relief. Had 4 injections in 1 year so will have to wait until June.\par \par 12/31/21: follow up today. Pain in the lower back with tingling down the bilateral legs. She is going away next week. She had a dull ache last month and the pain got worse.\par \par 11/12/21- Patient had 60% relief from TFESI. Does not want medication. Has 1 injection left until next June.\par \par 8/27/21- patient had 90% improvement from GABBI.\par \par 7/2/21: follow up today after b/l L5/S1 TFESI on 6/17/21. she reports >90% relief following.\par \par 6/4/21- Patient states last June she jumped out of bed and felt pain right away. She had seen Dr. Cox who\par recommended TFESI. She felt better without injection. Pain is in b/l low back radiating down both legs. No PT and just taking Tylenol. Gets n/t down the posterior legs. Tato any weakness in the legs. She reports pain down the posterior bilateral legs to the ankles.\par MRI lumbar spine (5/21/20) T12/L1 moderate to severe spondylosis, L1/2 moderate b/l NF stenosis, L3/4 severe FA and severe central stenosis, L4/5 severe b/l FA, L5/S1 severe left FA. [] : no [FreeTextEntry7] : B/L buttock down to the legs and ankles  [FreeTextEntry9] : Tylenol  [de-identified] : mri l spine

## 2022-09-16 NOTE — ASSESSMENT
[FreeTextEntry1] : After discussing various treatment options with the patient including but not limited to oral medications, physical therapy, exercise, modalities as well as interventional spinal injections, we have decided with the following plan:\par \par 1) would like to hold off on injections currently. Would consider Caudal GABBI in the future. \par Patient is presenting with acute/sub-acute radicular pain with impairment in ADLs and functionality.  The pain has not responded to  conservative care including nsaid therapy and/or physical therapy.  There is no bleeding tendency, unstable medical condition, or systemic infection. \par \par 2) Continue active and healthy lifestyle. Encourage ambulation minimum of 30 minutes per day.

## 2022-09-16 NOTE — PHYSICAL EXAM
[Normal Mood and Affect] : normal mood and affect [Orientated] : orientated [Able to Communicate] : able to communicate [No Respiratory Distress] : no respiratory distress [] : motor exam is non-focal throughout both lower extremities

## 2022-10-25 ENCOUNTER — APPOINTMENT (OUTPATIENT)
Dept: INTERNAL MEDICINE | Facility: CLINIC | Age: 82
End: 2022-10-25

## 2022-10-25 VITALS
HEART RATE: 81 BPM | SYSTOLIC BLOOD PRESSURE: 148 MMHG | WEIGHT: 135 LBS | DIASTOLIC BLOOD PRESSURE: 80 MMHG | OXYGEN SATURATION: 100 % | BODY MASS INDEX: 28.34 KG/M2 | TEMPERATURE: 98.3 F | HEIGHT: 58 IN

## 2022-10-25 VITALS — DIASTOLIC BLOOD PRESSURE: 86 MMHG | SYSTOLIC BLOOD PRESSURE: 142 MMHG

## 2022-10-25 DIAGNOSIS — M54.30 SCIATICA, UNSPECIFIED SIDE: ICD-10-CM

## 2022-10-25 PROCEDURE — 99213 OFFICE O/P EST LOW 20 MIN: CPT

## 2022-10-25 NOTE — HISTORY OF PRESENT ILLNESS
[FreeTextEntry8] : 82 year old female here for feelings of " feeling not herself" patient currently undergoing a lot of stress. Patient taking on too much and felt that her blood pressure was elevated. patient also c/o sciatica pain that has been on going and unrelieved by injections. patient denies any depression or anxiety just wants someone to talk to. Suggested therapist patient declined states she feels better after talking today. Patient wakes up with the sciatica pain and struggles with it often. looking for alternative medication. no chest pain no sob.

## 2022-10-25 NOTE — ADDENDUM
[FreeTextEntry1] : started cyclobenzaprine for sciatica\par recommended therapist to talk to but declined.\par f/u with dr fisher when he returns

## 2022-11-07 ENCOUNTER — APPOINTMENT (OUTPATIENT)
Dept: PAIN MANAGEMENT | Facility: CLINIC | Age: 82
End: 2022-11-07

## 2022-11-09 ENCOUNTER — APPOINTMENT (OUTPATIENT)
Dept: PAIN MANAGEMENT | Facility: CLINIC | Age: 82
End: 2022-11-09

## 2022-11-09 VITALS — BODY MASS INDEX: 28.34 KG/M2 | HEIGHT: 58 IN | WEIGHT: 135 LBS

## 2022-11-09 PROCEDURE — 99214 OFFICE O/P EST MOD 30 MIN: CPT

## 2022-11-09 NOTE — PHYSICAL EXAM
[Normal Mood and Affect] : normal mood and affect [Orientated] : orientated [Able to Communicate] : able to communicate [No Respiratory Distress] : no respiratory distress [] : no numbness/tingling of leg

## 2022-11-09 NOTE — ASSESSMENT
[FreeTextEntry1] : After discussing various treatment options with the patient including but not limited to oral medications, physical therapy, exercise, modalities as well as interventional spinal injections, we have decided with the following plan:\par \par 1) Intervention Injection Therapy:\par I personally reviewed the MRI/CT scan images and agree with the radiologist's report. The radiological findings were discussed with the patient.\par The risks, benefits, contents and alternatives to injection were explained in full to the patient. Risks outlined include but are not limited to infection,sepsis, bleeding, post-dural puncture headache, nerve damage, temporary increase in pain, syncopal episode, failure to resolve symptoms, allergic reaction, symptom recurrence, and elevation of blood sugar in diabetics. Cortisone may cause immunosuppression. Patient understands the risks. All questions were answered. After discussion of options, patient requested an injection. Information regarding the injection was given to the patient. Which medications to stop prior to the injection was explained to the patient as well.\par \par Follow up in 1-2 weeks post injection for re-evaluation. \par Continue Home exercises, stretching, activity modification, physical therapy, and conservative care.\par \par Patient is presenting with acute/sub-acute radicular pain with impairment in ADLs and functionality.  The pain has not responded to  conservative care including nsaid therapy and/or physical therapy.  There is no bleeding tendency, unstable medical condition, or systemic infection. \par \par Caudal GABBI

## 2022-11-09 NOTE — HISTORY OF PRESENT ILLNESS
[Lower back] : lower back [Dull/Aching] : dull/aching [Radiating] : radiating [Sharp] : sharp [Rest] : rest [Sitting] : sitting [Standing] : standing [Walking] : walking [Bending forward] : bending forward [Stairs] : stairs [Retired] : Work status: retired [8] : 8 [6] : 6 [Stabbing] : stabbing [Constant] : constant [Injection therapy] : injection therapy [Nothing helps with pain getting better] : Nothing helps with pain getting better [FreeTextEntry1] : 11/9/22: follow up today. Pain is getting worse.  Most of her pain is in the lower back with radiation down both legs to the ankles. She gets jolting pain in the right buttock. \par \par 09/16/2022: follow up today after MBB on 9/1/22.  Denies much relief from the injection. Still able to work. Pain does limit her activity here and there. \par \par 08/12/2022: follow up today after b/l lumbar MBB on 7/28/22.  Had 80% relief 1 week later.  Will repeat MBB.\par \par 7/8/22- Patient here for follow up.  Still in pain.  would recommend MBB. \par \par 04/22/2022: follow up today. She is having increased pain i the lower back with radiation down the legs. Allergic to  aspirin. Medrol in January gave her mild relief. Got nauseous from tramadol \par \par 2/18/22: follow up today after b/l L5/S1 on 2/3/22. Had 80% relief. Had 4 injections in 1 year so will have to wait until June.\par \par 12/31/21: follow up today. Pain in the lower back with tingling down the bilateral legs. She is going away next week. She had a dull ache last month and the pain got worse.\par \par 11/12/21- Patient had 60% relief from TFESI. Does not want medication. Has 1 injection left until next June.\par \par 8/27/21- patient had 90% improvement from GABBI.\par \par 7/2/21: follow up today after b/l L5/S1 TFESI on 6/17/21. she reports >90% relief following.\par \par 6/4/21- Patient states last June she jumped out of bed and felt pain right away. She had seen Dr. Cox who\par recommended TFESI. She felt better without injection. Pain is in b/l low back radiating down both legs. No PT and just taking Tylenol. Gets n/t down the posterior legs. Tato any weakness in the legs. She reports pain down the posterior bilateral legs to the ankles.\par \par MRI lumbar spine (5/21/20) T12/L1 moderate to severe spondylosis, L1/2 moderate b/l NF stenosis, L3/4 severe FA and severe central stenosis, L4/5 severe b/l FA, L5/S1 severe left FA. [] : no [FreeTextEntry7] : B/L buttock down to the legs and ankles  [FreeTextEntry9] : Tylenol  [de-identified] : mri l spine

## 2022-11-23 ENCOUNTER — APPOINTMENT (OUTPATIENT)
Dept: INTERNAL MEDICINE | Facility: CLINIC | Age: 82
End: 2022-11-23

## 2022-11-25 ENCOUNTER — NON-APPOINTMENT (OUTPATIENT)
Age: 82
End: 2022-11-25

## 2022-12-01 ENCOUNTER — APPOINTMENT (OUTPATIENT)
Age: 82
End: 2022-12-01

## 2022-12-01 PROCEDURE — 62323 NJX INTERLAMINAR LMBR/SAC: CPT

## 2022-12-16 ENCOUNTER — APPOINTMENT (OUTPATIENT)
Dept: PAIN MANAGEMENT | Facility: CLINIC | Age: 82
End: 2022-12-16

## 2022-12-16 VITALS — HEIGHT: 58 IN | WEIGHT: 135 LBS | BODY MASS INDEX: 28.34 KG/M2

## 2022-12-16 PROCEDURE — 99214 OFFICE O/P EST MOD 30 MIN: CPT

## 2022-12-16 NOTE — HISTORY OF PRESENT ILLNESS
[Lower back] : lower back [8] : 8 [6] : 6 [Dull/Aching] : dull/aching [Radiating] : radiating [Sharp] : sharp [Stabbing] : stabbing [Constant] : constant [Rest] : rest [Injection therapy] : injection therapy [Nothing helps with pain getting better] : Nothing helps with pain getting better [Sitting] : sitting [Standing] : standing [Walking] : walking [Bending forward] : bending forward [Stairs] : stairs [Retired] : Work status: retired [de-identified] : 12/16/22- FU for Caudal on 12/1 still has pain 30% relief. Her pain is intermittent. She can not isolate any any aggravating factors. Pain just comes on without instigation. Pain in the back and down both legs. \par \par 11/9/22: follow up today. Pain is getting worse.  Most of her pain is in the lower back with radiation down both legs to the ankles. She gets jolting pain in the right buttock. \par \par 09/16/2022: follow up today after MBB on 9/1/22.  Denies much relief from the injection. Still able to work. Pain does limit her activity here and there. \par \par 08/12/2022: follow up today after b/l lumbar MBB on 7/28/22.  Had 80% relief 1 week later.  Will repeat MBB.\par \par 7/8/22- Patient here for follow up.  Still in pain.  would recommend MBB. \par \par 04/22/2022: follow up today. She is having increased pain i the lower back with radiation down the legs. Allergic to  aspirin. Medrol in January gave her mild relief. Got nauseous from tramadol \par \par 2/18/22: follow up today after b/l L5/S1 on 2/3/22. Had 80% relief. Had 4 injections in 1 year so will have to wait until June.\par \par 12/31/21: follow up today. Pain in the lower back with tingling down the bilateral legs. She is going away next week. She had a dull ache last month and the pain got worse.\par \par 11/12/21- Patient had 60% relief from TFESI. Does not want medication. Has 1 injection left until next June.\par \par 8/27/21- patient had 90% improvement from GABBI.\par \par 7/2/21: follow up today after b/l L5/S1 TFESI on 6/17/21. she reports >90% relief following.\par \par 6/4/21- Patient states last June she jumped out of bed and felt pain right away. She had seen Dr. Cox who\par recommended TFESI. She felt better without injection. Pain is in b/l low back radiating down both legs. No PT and just taking Tylenol. Gets n/t down the posterior legs. Tato any weakness in the legs. She reports pain down the posterior bilateral legs to the ankles.\par \par MRI lumbar spine (5/21/20) T12/L1 moderate to severe spondylosis, L1/2 moderate b/l NF stenosis, L3/4 severe FA and severe central stenosis, L4/5 severe b/l FA, L5/S1 severe left FA. [FreeTextEntry7] : B/L buttock down to the legs and ankles  [] : no [FreeTextEntry9] : Tylenol  [de-identified] : mri l spine  [FreeTextEntry1] : 12/16/22- FU for Caudal on 12/1\par \par 11/9/22: follow up today. Pain is getting worse.  Most of her pain is in the lower back with radiation down both legs to the ankles. She gets jolting pain in the right buttock. \par \par 09/16/2022: follow up today after MBB on 9/1/22.  Denies much relief from the injection. Still able to work. Pain does limit her activity here and there. \par \par 08/12/2022: follow up today after b/l lumbar MBB on 7/28/22.  Had 80% relief 1 week later.  Will repeat MBB.\par \par 7/8/22- Patient here for follow up.  Still in pain.  would recommend MBB. \par \par 04/22/2022: follow up today. She is having increased pain i the lower back with radiation down the legs. Allergic to  aspirin. Medrol in January gave her mild relief. Got nauseous from tramadol \par \par 2/18/22: follow up today after b/l L5/S1 on 2/3/22. Had 80% relief. Had 4 injections in 1 year so will have to wait until June.\par \par 12/31/21: follow up today. Pain in the lower back with tingling down the bilateral legs. She is going away next week. She had a dull ache last month and the pain got worse.\par \par 11/12/21- Patient had 60% relief from TFESI. Does not want medication. Has 1 injection left until next June.\par \par 8/27/21- patient had 90% improvement from GABBI.\par \par 7/2/21: follow up today after b/l L5/S1 TFESI on 6/17/21. she reports >90% relief following.\par \par 6/4/21- Patient states last June she jumped out of bed and felt pain right away. She had seen Dr. Cox who\par recommended TFESI. She felt better without injection. Pain is in b/l low back radiating down both legs. No PT and just taking Tylenol. Gets n/t down the posterior legs. Tato any weakness in the legs. She reports pain down the posterior bilateral legs to the ankles.\par \par MRI lumbar spine (5/21/20) T12/L1 moderate to severe spondylosis, L1/2 moderate b/l NF stenosis, L3/4 severe FA and severe central stenosis, L4/5 severe b/l FA, L5/S1 severe left FA.

## 2022-12-16 NOTE — ASSESSMENT
[FreeTextEntry1] : After discussing various treatment options with the patient including but not limited to oral medications, physical therapy, exercise, modalities as well as interventional spinal injections, we have decided with the following plan:\par \par 1) Continue active and healthy lifestyle. Encourage ambulation minimum of 30 minutes per day. \par \par 2) I would recommend a trial of neuropathic medication as patient presents with signs of nerve irritation. (ie burning, paresthesias etc) Goals of therapy would be to improve pain and overall QOL. Side effects reviewed with patient. Patient will call or stop medication if given side effects occur. \par \par will trial low dose Pamelor

## 2023-01-09 ENCOUNTER — RX CHANGE (OUTPATIENT)
Age: 83
End: 2023-01-09

## 2023-01-09 RX ORDER — NORTRIPTYLINE HYDROCHLORIDE 25 MG/1
25 CAPSULE ORAL
Qty: 30 | Refills: 2 | Status: DISCONTINUED | COMMUNITY
Start: 2022-12-16 | End: 2023-01-09

## 2023-01-13 ENCOUNTER — RX RENEWAL (OUTPATIENT)
Age: 83
End: 2023-01-13

## 2023-01-13 ENCOUNTER — APPOINTMENT (OUTPATIENT)
Dept: PAIN MANAGEMENT | Facility: CLINIC | Age: 83
End: 2023-01-13
Payer: MEDICARE

## 2023-01-13 VITALS — WEIGHT: 135 LBS | BODY MASS INDEX: 28.34 KG/M2 | HEIGHT: 58 IN

## 2023-01-13 PROCEDURE — 99214 OFFICE O/P EST MOD 30 MIN: CPT

## 2023-01-13 NOTE — ASSESSMENT
RT PROGRESS NOTE    DATA  CURRENT SETTINGS --  Ventilation Mode: Trach collar  FiO2 (%): 21 %  Resp: 22     TRACH TYPE -- Bivona #6 Cuffed 7/28     ACTION  THERAPIES -- BID Mucomyst, Duoneb, and NaCl 3%  SUCTION   FREQUENCY -- 10+   AMOUNT -- moderate to large   CONSISTENCY -- thick    COLOR -- white/yellow    SPONTANEOUS COUGH -- strong   WEAN PHASE #2   MODE -- HTD 20 LPM 21%   DURATION -- 24/7   END REASON -- N/A    RESPONSE  BREATH SOUNDS -- Coarse   VITAL SIGNS --  Temp:  [98  F (36.7  C)-98.4  F (36.9  C)] 98  F (36.7  C)  Pulse:  [71-91] 84  Resp:  [20-24] 22  BP: (115-151)/(69-93) 117/75  FiO2 (%):  [21 %] 21 %  SpO2:  [92 %-100 %] 92 %   EMOTIONAL CONCERNS -- None   RISK FOR SELF-DECANNULATION -- No     NOTE   Patient wore PMV from 0718 to 1911 inline with the HTD. No signs of respiratory distress. RT will continue to monitor.     Kayla Small, RT on 8/20/2021 at 2:41 AM       [FreeTextEntry1] : After discussing various treatment options with the patient including but not limited to oral medications, physical therapy, exercise, modalities as well as interventional spinal injections, we have decided with the following plan:\par \par 1) Continue active and healthy lifestyle. Encourage ambulation minimum of 30 minutes per day. \par \par 2) I would recommend a trial of neuropathic medication as patient presents with signs of nerve irritation. (ie burning, paresthesias etc) Goals of therapy would be to improve pain and overall QOL. Side effects reviewed with patient. Patient will call or stop medication if given side effects occur. \par \par Continue Cymbalta\par \par 3) A MRI is indicated as there has been failure of numerous conservative therapies over the last 6-8 weeks. these include but are not limited to medication therapy and physical therapy.

## 2023-01-13 NOTE — HISTORY OF PRESENT ILLNESS
[Lower back] : lower back [Dull/Aching] : dull/aching [Radiating] : radiating [Sharp] : sharp [Stabbing] : stabbing [Constant] : constant [Rest] : rest [Injection therapy] : injection therapy [Nothing helps with pain getting better] : Nothing helps with pain getting better [Sitting] : sitting [Standing] : standing [Walking] : walking [Bending forward] : bending forward [Stairs] : stairs [Retired] : Work status: retired [10] : 10 [8] : 8 [FreeTextEntry1] : 01/13/2023: follow up today. Started on Cymbalta last week. Just started 2 tabs yesterday. Has not noticed much of difference yet .Pain in the back and down the legs.  pain is severe. can not raise left leg without pain. \par \par 12/16/22- FU for Caudal on 12/1\par \par 11/9/22: follow up today. Pain is getting worse.  Most of her pain is in the lower back with radiation down both legs to the ankles. She gets jolting pain in the right buttock. \par \par 09/16/2022: follow up today after MBB on 9/1/22.  Denies much relief from the injection. Still able to work. Pain does limit her activity here and there. \par \par 08/12/2022: follow up today after b/l lumbar MBB on 7/28/22.  Had 80% relief 1 week later.  Will repeat MBB.\par \par 7/8/22- Patient here for follow up.  Still in pain.  would recommend MBB. \par \par 04/22/2022: follow up today. She is having increased pain i the lower back with radiation down the legs. Allergic to  aspirin. Medrol in January gave her mild relief. Got nauseous from tramadol \par \par 2/18/22: follow up today after b/l L5/S1 on 2/3/22. Had 80% relief. Had 4 injections in 1 year so will have to wait until June.\par \par 12/31/21: follow up today. Pain in the lower back with tingling down the bilateral legs. She is going away next week. She had a dull ache last month and the pain got worse.\par \par 11/12/21- Patient had 60% relief from TFESI. Does not want medication. Has 1 injection left until next June.\par \par 8/27/21- patient had 90% improvement from GABBI.\par \par 7/2/21: follow up today after b/l L5/S1 TFESI on 6/17/21. she reports >90% relief following.\par \par 6/4/21- Patient states last June she jumped out of bed and felt pain right away. She had seen Dr. Cox who\par recommended TFESI. She felt better without injection. Pain is in b/l low back radiating down both legs. No PT and just taking Tylenol. Gets n/t down the posterior legs. Tato any weakness in the legs. She reports pain down the posterior bilateral legs to the ankles.\par \par MRI lumbar spine (5/21/20) T12/L1 moderate to severe spondylosis, L1/2 moderate b/l NF stenosis, L3/4 severe FA and severe central stenosis, L4/5 severe b/l FA, L5/S1 severe left FA. [] : no [FreeTextEntry7] : B/L buttock down to the legs and ankles  [FreeTextEntry9] : Tylenol  [de-identified] : mri l spine  [de-identified] : 12/16/22- FU for Caudal on 12/1 still has pain 30% relief. Her pain is intermittent. She can not isolate any any aggravating factors. Pain just comes on without instigation. Pain in the back and down both legs. \par \par 11/9/22: follow up today. Pain is getting worse.  Most of her pain is in the lower back with radiation down both legs to the ankles. She gets jolting pain in the right buttock. \par \par 09/16/2022: follow up today after MBB on 9/1/22.  Denies much relief from the injection. Still able to work. Pain does limit her activity here and there. \par \par 08/12/2022: follow up today after b/l lumbar MBB on 7/28/22.  Had 80% relief 1 week later.  Will repeat MBB.\par \par 7/8/22- Patient here for follow up.  Still in pain.  would recommend MBB. \par \par 04/22/2022: follow up today. She is having increased pain i the lower back with radiation down the legs. Allergic to  aspirin. Medrol in January gave her mild relief. Got nauseous from tramadol \par \par 2/18/22: follow up today after b/l L5/S1 on 2/3/22. Had 80% relief. Had 4 injections in 1 year so will have to wait until June.\par \par 12/31/21: follow up today. Pain in the lower back with tingling down the bilateral legs. She is going away next week. She had a dull ache last month and the pain got worse.\par \par 11/12/21- Patient had 60% relief from TFESI. Does not want medication. Has 1 injection left until next June.\par \par 8/27/21- patient had 90% improvement from GABBI.\par \par 7/2/21: follow up today after b/l L5/S1 TFESI on 6/17/21. she reports >90% relief following.\par \par 6/4/21- Patient states last June she jumped out of bed and felt pain right away. She had seen Dr. Cox who\par recommended TFESI. She felt better without injection. Pain is in b/l low back radiating down both legs. No PT and just taking Tylenol. Gets n/t down the posterior legs. Tato any weakness in the legs. She reports pain down the posterior bilateral legs to the ankles.\par \par MRI lumbar spine (5/21/20) T12/L1 moderate to severe spondylosis, L1/2 moderate b/l NF stenosis, L3/4 severe FA and severe central stenosis, L4/5 severe b/l FA, L5/S1 severe left FA.

## 2023-01-27 ENCOUNTER — RX CHANGE (OUTPATIENT)
Age: 83
End: 2023-01-27

## 2023-01-27 RX ORDER — DULOXETINE HYDROCHLORIDE 30 MG/1
30 CAPSULE, DELAYED RELEASE PELLETS ORAL
Qty: 60 | Refills: 0 | Status: DISCONTINUED | COMMUNITY
Start: 2023-01-04 | End: 2023-01-27

## 2023-02-13 PROBLEM — M54.16 LUMBAR RADICULAR SYNDROME: Status: ACTIVE | Noted: 2020-06-23

## 2023-02-14 ENCOUNTER — APPOINTMENT (OUTPATIENT)
Dept: ORTHOPEDIC SURGERY | Facility: CLINIC | Age: 83
End: 2023-02-14

## 2023-02-14 ENCOUNTER — APPOINTMENT (OUTPATIENT)
Dept: PAIN MANAGEMENT | Facility: CLINIC | Age: 83
End: 2023-02-14
Payer: MEDICARE

## 2023-02-14 VITALS — BODY MASS INDEX: 28.34 KG/M2 | HEIGHT: 58 IN | WEIGHT: 135 LBS

## 2023-02-14 DIAGNOSIS — M54.16 RADICULOPATHY, LUMBAR REGION: ICD-10-CM

## 2023-02-14 PROCEDURE — 99214 OFFICE O/P EST MOD 30 MIN: CPT

## 2023-02-14 NOTE — ASSESSMENT
[FreeTextEntry1] : After discussing various treatment options with the patient including but not limited to oral medications, physical therapy, exercise, modalities as well as interventional spinal injections, we have decided with the following plan:\par \par 1) Continue active and healthy lifestyle. Encourage ambulation minimum of 30 minutes per day. \par \par 2) she has since stopped cymbalta as it is not helping. \par \par 3) chiro and acupuncture. \par

## 2023-02-14 NOTE — HISTORY OF PRESENT ILLNESS
[Lower back] : lower back [Dull/Aching] : dull/aching [Radiating] : radiating [Sharp] : sharp [Stabbing] : stabbing [Constant] : constant [Rest] : rest [Injection therapy] : injection therapy [Nothing helps with pain getting better] : Nothing helps with pain getting better [Sitting] : sitting [Standing] : standing [Walking] : walking [Bending forward] : bending forward [Stairs] : stairs [Retired] : Work status: retired [8] : 8 [7] : 7 [de-identified] : 02/14/2023: follow up today to review MRI: ([facility]): 1/20/23: T12/L1 disc protrusion with right NF stenosis, L3/4 with disc bulge and severe FA and severe stenosis, L4/5 lithesis, FA with moderate stenosis. L1/2 L2/3 FA.  Essentially unchanged from prior. Pain in the lower back and intermittent into the legs. +n/t. \par \par 12/16/22- FU for Caudal on 12/1 still has pain 30% relief. Her pain is intermittent. She can not isolate any any aggravating factors. Pain just comes on without instigation. Pain in the back and down both legs. \par \par 11/9/22: follow up today. Pain is getting worse.  Most of her pain is in the lower back with radiation down both legs to the ankles. She gets jolting pain in the right buttock. \par \par 09/16/2022: follow up today after MBB on 9/1/22.  Denies much relief from the injection. Still able to work. Pain does limit her activity here and there. \par \par 08/12/2022: follow up today after b/l lumbar MBB on 7/28/22.  Had 80% relief 1 week later.  Will repeat MBB.\par \par 7/8/22- Patient here for follow up.  Still in pain.  would recommend MBB. \par \par 04/22/2022: follow up today. She is having increased pain i the lower back with radiation down the legs. Allergic to  aspirin. Medrol in January gave her mild relief. Got nauseous from tramadol \par \par 2/18/22: follow up today after b/l L5/S1 on 2/3/22. Had 80% relief. Had 4 injections in 1 year so will have to wait until June.\par \par 12/31/21: follow up today. Pain in the lower back with tingling down the bilateral legs. She is going away next week. She had a dull ache last month and the pain got worse.\par \par 11/12/21- Patient had 60% relief from TFESI. Does not want medication. Has 1 injection left until next June.\par \par 8/27/21- patient had 90% improvement from GABBI.\par \par 7/2/21: follow up today after b/l L5/S1 TFESI on 6/17/21. she reports >90% relief following.\par \par 6/4/21- Patient states last June she jumped out of bed and felt pain right away. She had seen Dr. Cox who\par recommended TFESI. She felt better without injection. Pain is in b/l low back radiating down both legs. No PT and just taking Tylenol. Gets n/t down the posterior legs. Tato any weakness in the legs. She reports pain down the posterior bilateral legs to the ankles.\par \par MRI lumbar spine (5/21/20) T12/L1 moderate to severe spondylosis, L1/2 moderate b/l NF stenosis, L3/4 severe FA and severe central stenosis, L4/5 severe b/l FA, L5/S1 severe left FA. [] : no [FreeTextEntry7] : B/L buttock down to the legs and ankles  [FreeTextEntry9] : Tylenol  [de-identified] : mri l spine  [FreeTextEntry1] : 2/14/23- MRI\par \par 01/13/2023: follow up today. Started on Cymbalta last week. Just started 2 tabs yesterday. Has not noticed much of difference yet .Pain in the back and down the legs.  pain is severe. can not raise left leg without pain. \par \par 12/16/22- FU for Caudal on 12/1\par \par 11/9/22: follow up today. Pain is getting worse.  Most of her pain is in the lower back with radiation down both legs to the ankles. She gets jolting pain in the right buttock. \par \par 09/16/2022: follow up today after MBB on 9/1/22.  Denies much relief from the injection. Still able to work. Pain does limit her activity here and there. \par \par 08/12/2022: follow up today after b/l lumbar MBB on 7/28/22.  Had 80% relief 1 week later.  Will repeat MBB.\par \par 7/8/22- Patient here for follow up.  Still in pain.  would recommend MBB. \par \par 04/22/2022: follow up today. She is having increased pain i the lower back with radiation down the legs. Allergic to  aspirin. Medrol in January gave her mild relief. Got nauseous from tramadol \par \par 2/18/22: follow up today after b/l L5/S1 on 2/3/22. Had 80% relief. Had 4 injections in 1 year so will have to wait until June.\par \par 12/31/21: follow up today. Pain in the lower back with tingling down the bilateral legs. She is going away next week. She had a dull ache last month and the pain got worse.\par \par 11/12/21- Patient had 60% relief from TFESI. Does not want medication. Has 1 injection left until next June.\par \par 8/27/21- patient had 90% improvement from GABBI.\par \par 7/2/21: follow up today after b/l L5/S1 TFESI on 6/17/21. she reports >90% relief following.\par \par 6/4/21- Patient states last June she jumped out of bed and felt pain right away. She had seen Dr. Cox who\par recommended TFESI. She felt better without injection. Pain is in b/l low back radiating down both legs. No PT and just taking Tylenol. Gets n/t down the posterior legs. Tato any weakness in the legs. She reports pain down the posterior bilateral legs to the ankles.\par \par MRI lumbar spine (5/21/20) T12/L1 moderate to severe spondylosis, L1/2 moderate b/l NF stenosis, L3/4 severe FA and severe central stenosis, L4/5 severe b/l FA, L5/S1 severe left FA.

## 2023-02-27 ENCOUNTER — APPOINTMENT (OUTPATIENT)
Dept: ORTHOPEDIC SURGERY | Facility: CLINIC | Age: 83
End: 2023-02-27
Payer: MEDICARE

## 2023-02-27 VITALS — HEIGHT: 58 IN | BODY MASS INDEX: 28.34 KG/M2 | WEIGHT: 135 LBS

## 2023-02-27 DIAGNOSIS — M48.061 SPINAL STENOSIS, LUMBAR REGION WITHOUT NEUROGENIC CLAUDICATION: ICD-10-CM

## 2023-02-27 DIAGNOSIS — M47.816 SPONDYLOSIS W/OUT MYELOPATHY OR RADICULOPATHY, LUMBAR REGION: ICD-10-CM

## 2023-02-27 DIAGNOSIS — M43.17 SPONDYLOLISTHESIS, LUMBOSACRAL REGION: ICD-10-CM

## 2023-02-27 PROCEDURE — 99215 OFFICE O/P EST HI 40 MIN: CPT

## 2023-02-28 PROBLEM — M47.816 LUMBAR SPONDYLOSIS: Status: ACTIVE | Noted: 2022-04-22

## 2023-02-28 PROBLEM — M48.061 LUMBAR STENOSIS: Status: ACTIVE | Noted: 2022-04-22

## 2023-02-28 PROBLEM — M43.17 ACQUIRED SPONDYLOLISTHESIS OF LUMBOSACRAL REGION: Status: ACTIVE | Noted: 2023-02-28

## 2023-02-28 NOTE — ASSESSMENT
[FreeTextEntry1] : there are two sites of moderate to severe central and lateral recess stenosis;  and at those levels there is a slip;\par the foramen are not severely narrowed; but the central and LR recess needs to be decompressed;  the disc height are still tall enough (surprisingly) such that the slipped levels need to be stabilized;  with out fusing the two slips there is expected to be progressing and then loss of the decompression that the lami will achieve;  so a lami fusion is her best option, even though ti carries higher morbidity versus just a lami;

## 2023-02-28 NOTE — DATA REVIEWED
[MRI] : MRI [Lumbar Spine] : lumbar spine [FreeTextEntry1] : MRI shows:  two levels of moderate to advanced stenosis and a two level slip with moderate foraminal stenosis

## 2023-02-28 NOTE — IMAGING
[de-identified] : gait antalgic\par motor 5/5\par sensation I LT\par - SLR\par radicular pain with lumbar extension\par hip and knee exam negative for OA

## 2023-02-28 NOTE — HISTORY OF PRESENT ILLNESS
[Lower back] : lower back [10] : 10 [Radiating] : radiating [Sharp] : sharp [Shooting] : shooting [Constant] : constant [Sleep] : sleep [Nothing helps with pain getting better] : Nothing helps with pain getting better [de-identified] : back and buttock pain;  has exhausted nonsurgical care in the form of time , meds , PT and injections;  received temporary relief from the injections; is contemplating surgery [] : no [FreeTextEntry7] : b/l legs [FreeTextEntry5] : ISHMAEL VIEYRA is an 82 year old female presenting with low back pain that started 2 years ago.Pain is shooting down b/l legs. Previously seen Amanda; 3 epidurals in 2021; 3 epidurals in 2022. None has helped.  [de-identified] : MRI L Spine

## 2023-03-06 ENCOUNTER — APPOINTMENT (OUTPATIENT)
Dept: ORTHOPEDIC SURGERY | Facility: CLINIC | Age: 83
End: 2023-03-06
Payer: MEDICARE

## 2023-03-06 VITALS — BODY MASS INDEX: 28.34 KG/M2 | WEIGHT: 135 LBS | HEIGHT: 58 IN

## 2023-03-06 PROCEDURE — 99214 OFFICE O/P EST MOD 30 MIN: CPT

## 2023-03-06 NOTE — HISTORY OF PRESENT ILLNESS
[Lower back] : lower back [10] : 10 [Radiating] : radiating [Sharp] : sharp [Shooting] : shooting [Constant] : constant [Sleep] : sleep [Nothing helps with pain getting better] : Nothing helps with pain getting better [] : no [FreeTextEntry5] : ISHMAEL VIEYRA is an 82 year old female following up with low back pain that started 2 years ago.Pain is shooting down b/l legs. Previously seen Amanda; 3 epidurals in 2021; 3 epidurals in 2022. None has helped.  [FreeTextEntry7] : b/l legs [de-identified] : MRI L Spine

## 2023-03-06 NOTE — ASSESSMENT
[FreeTextEntry1] : went over the anticipated benefits of the surgery and compared it to the potential and likely risks;  major concern I have is having her under anesthesia for the hours rquired to do the surgery;  am confident as to what is needed and how to do it, but the simple fact that general anesthesia for that much time is hard on patients in her age group;  post op delirium, sleepiness, constipation, confusion etc.\par \par the alternative to the proposed surgery is to live within the boundaries that ths pain has imposed;  she is not likely to get much if at all worse, not likely but could;  instead she could reach a point where the same amount of pain is not longer bearable;  surgical planner going to call her to establisha  line of comm;  plan will be lami fusion L345

## 2023-03-19 ENCOUNTER — FORM ENCOUNTER (OUTPATIENT)
Age: 83
End: 2023-03-19

## 2023-03-22 ENCOUNTER — OUTPATIENT (OUTPATIENT)
Dept: OUTPATIENT SERVICES | Facility: HOSPITAL | Age: 83
LOS: 1 days | Discharge: HOME | End: 2023-03-22
Payer: MEDICARE

## 2023-03-22 VITALS
RESPIRATION RATE: 17 BRPM | TEMPERATURE: 97 F | HEIGHT: 59 IN | WEIGHT: 133.6 LBS | DIASTOLIC BLOOD PRESSURE: 69 MMHG | SYSTOLIC BLOOD PRESSURE: 159 MMHG | HEART RATE: 78 BPM | OXYGEN SATURATION: 100 %

## 2023-03-22 DIAGNOSIS — M48.061 SPINAL STENOSIS, LUMBAR REGION WITHOUT NEUROGENIC CLAUDICATION: ICD-10-CM

## 2023-03-22 DIAGNOSIS — Z01.818 ENCOUNTER FOR OTHER PREPROCEDURAL EXAMINATION: ICD-10-CM

## 2023-03-22 DIAGNOSIS — E78.5 HYPERLIPIDEMIA, UNSPECIFIED: ICD-10-CM

## 2023-03-22 DIAGNOSIS — M43.17 SPONDYLOLISTHESIS, LUMBOSACRAL REGION: ICD-10-CM

## 2023-03-22 DIAGNOSIS — Z98.890 OTHER SPECIFIED POSTPROCEDURAL STATES: Chronic | ICD-10-CM

## 2023-03-22 LAB
A1C WITH ESTIMATED AVERAGE GLUCOSE RESULT: 6.2 % — HIGH (ref 4–5.6)
ALBUMIN SERPL ELPH-MCNC: 3.9 G/DL — SIGNIFICANT CHANGE UP (ref 3.3–5)
ALP SERPL-CCNC: 64 U/L — SIGNIFICANT CHANGE UP (ref 40–120)
ALT FLD-CCNC: 18 U/L — SIGNIFICANT CHANGE UP (ref 12–78)
ANION GAP SERPL CALC-SCNC: 6 MMOL/L — SIGNIFICANT CHANGE UP (ref 5–17)
APTT BLD: 30.3 SEC — SIGNIFICANT CHANGE UP (ref 27.5–35.5)
AST SERPL-CCNC: 24 U/L — SIGNIFICANT CHANGE UP (ref 15–37)
BASOPHILS # BLD AUTO: 0 K/UL — SIGNIFICANT CHANGE UP (ref 0–0.2)
BASOPHILS NFR BLD AUTO: 0 % — SIGNIFICANT CHANGE UP (ref 0–2)
BILIRUB SERPL-MCNC: 0.3 MG/DL — SIGNIFICANT CHANGE UP (ref 0.2–1.2)
BLD GP AB SCN SERPL QL: SIGNIFICANT CHANGE UP
BUN SERPL-MCNC: 22 MG/DL — SIGNIFICANT CHANGE UP (ref 7–23)
CALCIUM SERPL-MCNC: 9.2 MG/DL — SIGNIFICANT CHANGE UP (ref 8.5–10.1)
CHLORIDE SERPL-SCNC: 110 MMOL/L — HIGH (ref 96–108)
CO2 SERPL-SCNC: 26 MMOL/L — SIGNIFICANT CHANGE UP (ref 22–31)
CREAT SERPL-MCNC: 0.96 MG/DL — SIGNIFICANT CHANGE UP (ref 0.5–1.3)
EGFR: 59 ML/MIN/1.73M2 — LOW
EOSINOPHIL # BLD AUTO: 0.09 K/UL — SIGNIFICANT CHANGE UP (ref 0–0.5)
EOSINOPHIL NFR BLD AUTO: 1 % — SIGNIFICANT CHANGE UP (ref 0–6)
ESTIMATED AVERAGE GLUCOSE: 131 MG/DL — HIGH (ref 68–114)
GLUCOSE SERPL-MCNC: 108 MG/DL — HIGH (ref 70–99)
HCT VFR BLD CALC: 38 % — SIGNIFICANT CHANGE UP (ref 34.5–45)
HGB BLD-MCNC: 11.4 G/DL — LOW (ref 11.5–15.5)
INR BLD: 0.98 RATIO — SIGNIFICANT CHANGE UP (ref 0.88–1.16)
LYMPHOCYTES # BLD AUTO: 5.93 K/UL — HIGH (ref 1–3.3)
LYMPHOCYTES # BLD AUTO: 63 % — HIGH (ref 13–44)
MANUAL SMEAR VERIFICATION: YES — SIGNIFICANT CHANGE UP
MCHC RBC-ENTMCNC: 26.2 PG — LOW (ref 27–34)
MCHC RBC-ENTMCNC: 30 G/DL — LOW (ref 32–36)
MCV RBC AUTO: 87.4 FL — SIGNIFICANT CHANGE UP (ref 80–100)
MONOCYTES # BLD AUTO: 0.47 K/UL — SIGNIFICANT CHANGE UP (ref 0–0.9)
MONOCYTES NFR BLD AUTO: 5 % — SIGNIFICANT CHANGE UP (ref 2–14)
MRSA PCR RESULT.: SIGNIFICANT CHANGE UP
NEUTROPHILS # BLD AUTO: 2.92 K/UL — SIGNIFICANT CHANGE UP (ref 1.8–7.4)
NEUTROPHILS NFR BLD AUTO: 31 % — LOW (ref 43–77)
NRBC # BLD: 0 /100 — SIGNIFICANT CHANGE UP (ref 0–0)
NRBC # BLD: SIGNIFICANT CHANGE UP /100 WBCS (ref 0–0)
PLAT MORPH BLD: NORMAL — SIGNIFICANT CHANGE UP
PLATELET # BLD AUTO: 229 K/UL — SIGNIFICANT CHANGE UP (ref 150–400)
POTASSIUM SERPL-MCNC: 4.6 MMOL/L — SIGNIFICANT CHANGE UP (ref 3.5–5.3)
POTASSIUM SERPL-SCNC: 4.6 MMOL/L — SIGNIFICANT CHANGE UP (ref 3.5–5.3)
PROT SERPL-MCNC: 7.1 GM/DL — SIGNIFICANT CHANGE UP (ref 6–8.3)
PROTHROM AB SERPL-ACNC: 11.6 SEC — SIGNIFICANT CHANGE UP (ref 10.5–13.4)
RBC # BLD: 4.35 M/UL — SIGNIFICANT CHANGE UP (ref 3.8–5.2)
RBC # FLD: 13.1 % — SIGNIFICANT CHANGE UP (ref 10.3–14.5)
RBC BLD AUTO: NORMAL — SIGNIFICANT CHANGE UP
S AUREUS DNA NOSE QL NAA+PROBE: SIGNIFICANT CHANGE UP
SODIUM SERPL-SCNC: 142 MMOL/L — SIGNIFICANT CHANGE UP (ref 135–145)
VIT D25+D1,25 OH+D1,25 PNL SERPL-MCNC: 27.1 PG/ML — SIGNIFICANT CHANGE UP (ref 19.9–79.3)
WBC # BLD: 9.41 K/UL — SIGNIFICANT CHANGE UP (ref 3.8–10.5)
WBC # FLD AUTO: 9.41 K/UL — SIGNIFICANT CHANGE UP (ref 3.8–10.5)

## 2023-03-22 PROCEDURE — 93010 ELECTROCARDIOGRAM REPORT: CPT

## 2023-03-22 NOTE — H&P PST ADULT - HISTORY OF PRESENT ILLNESS
83yo female with medical h/o HDL, reports Lower back pain r/t Spondylolisthesis lumbosacral region, presents today for PST for scheduled Laminectomy L3, L4-L5 Posterolateral Fusion on 4/5/2023

## 2023-03-23 PROBLEM — E78.5 HYPERLIPIDEMIA, UNSPECIFIED: Chronic | Status: ACTIVE | Noted: 2023-03-22

## 2023-03-23 PROBLEM — M43.10 SPONDYLOLISTHESIS, SITE UNSPECIFIED: Chronic | Status: ACTIVE | Noted: 2023-03-22

## 2023-03-29 ENCOUNTER — APPOINTMENT (OUTPATIENT)
Dept: INTERNAL MEDICINE | Facility: CLINIC | Age: 83
End: 2023-03-29
Payer: MEDICARE

## 2023-03-29 VITALS
WEIGHT: 131.13 LBS | OXYGEN SATURATION: 97 % | TEMPERATURE: 98.5 F | HEIGHT: 58 IN | BODY MASS INDEX: 27.53 KG/M2 | HEART RATE: 71 BPM | SYSTOLIC BLOOD PRESSURE: 174 MMHG | DIASTOLIC BLOOD PRESSURE: 92 MMHG

## 2023-03-29 VITALS — SYSTOLIC BLOOD PRESSURE: 110 MMHG | DIASTOLIC BLOOD PRESSURE: 70 MMHG

## 2023-03-29 DIAGNOSIS — Z01.818 ENCOUNTER FOR OTHER PREPROCEDURAL EXAMINATION: ICD-10-CM

## 2023-03-29 DIAGNOSIS — D64.9 ANEMIA, UNSPECIFIED: ICD-10-CM

## 2023-03-29 PROCEDURE — 99214 OFFICE O/P EST MOD 30 MIN: CPT

## 2023-03-29 RX ORDER — CYCLOBENZAPRINE HYDROCHLORIDE 10 MG/1
10 TABLET, FILM COATED ORAL
Qty: 30 | Refills: 0 | Status: DISCONTINUED | COMMUNITY
Start: 2022-10-25 | End: 2023-03-29

## 2023-03-29 RX ORDER — NORTRIPTYLINE HYDROCHLORIDE 25 MG/1
25 CAPSULE ORAL
Qty: 90 | Refills: 1 | Status: DISCONTINUED | COMMUNITY
Start: 2023-01-09 | End: 2023-03-29

## 2023-03-29 RX ORDER — DULOXETINE HYDROCHLORIDE 30 MG/1
30 CAPSULE, DELAYED RELEASE PELLETS ORAL
Qty: 180 | Refills: 0 | Status: DISCONTINUED | COMMUNITY
Start: 2023-01-27 | End: 2023-03-29

## 2023-03-29 RX ORDER — EZETIMIBE 10 MG/1
10 TABLET ORAL
Refills: 0 | Status: DISCONTINUED | COMMUNITY
End: 2023-03-29

## 2023-03-29 RX ORDER — METHYLPREDNISOLONE 4 MG/1
4 TABLET ORAL DAILY
Qty: 1 | Refills: 0 | Status: DISCONTINUED | COMMUNITY
Start: 2022-11-21 | End: 2023-03-29

## 2023-03-29 NOTE — PHYSICAL EXAM
[No Acute Distress] : no acute distress [Normal Sclera/Conjunctiva] : normal sclera/conjunctiva [Normal Outer Ear/Nose] : the outer ears and nose were normal in appearance [No JVD] : no jugular venous distention [No Respiratory Distress] : no respiratory distress  [No Accessory Muscle Use] : no accessory muscle use [Clear to Auscultation] : lungs were clear to auscultation bilaterally [Normal Rate] : normal rate  [Regular Rhythm] : with a regular rhythm [Normal S1, S2] : normal S1 and S2 [No Edema] : there was no peripheral edema [Coordination Grossly Intact] : coordination grossly intact [No Focal Deficits] : no focal deficits [Normal Gait] : normal gait [Normal] : affect was normal and insight and judgment were intact

## 2023-03-29 NOTE — HISTORY OF PRESENT ILLNESS
[No Pertinent Cardiac History] : no history of aortic stenosis, atrial fibrillation, coronary artery disease, recent myocardial infarction, or implantable device/pacemaker [No Pertinent Pulmonary History] : no history of asthma, COPD, sleep apnea, or smoking [No Adverse Anesthesia Reaction] : no adverse anesthesia reaction in self or family member [Chronic Anticoagulation] : no chronic anticoagulation [Chronic Kidney Disease] : no chronic kidney disease [Diabetes] : no diabetes [(Patient denies any chest pain, claudication, dyspnea on exertion, orthopnea, palpitations or syncope)] : Patient denies any chest pain, claudication, dyspnea on exertion, orthopnea, palpitations or syncope [FreeTextEntry1] : lumbar laminectomy [FreeTextEntry2] : 4/5/2023 [FreeTextEntry3] : georgina simons

## 2023-04-01 ENCOUNTER — NON-APPOINTMENT (OUTPATIENT)
Age: 83
End: 2023-04-01

## 2023-04-10 ENCOUNTER — NON-APPOINTMENT (OUTPATIENT)
Age: 83
End: 2023-04-10

## 2023-04-13 ENCOUNTER — TRANSCRIPTION ENCOUNTER (OUTPATIENT)
Age: 83
End: 2023-04-13

## 2023-04-14 ENCOUNTER — TRANSCRIPTION ENCOUNTER (OUTPATIENT)
Age: 83
End: 2023-04-14

## 2023-04-14 ENCOUNTER — INPATIENT (INPATIENT)
Facility: HOSPITAL | Age: 83
LOS: 2 days | Discharge: NURSING, REHAB OR LONG-TERM CARE | End: 2023-04-17
Attending: ORTHOPAEDIC SURGERY | Admitting: ORTHOPAEDIC SURGERY

## 2023-04-14 ENCOUNTER — APPOINTMENT (OUTPATIENT)
Dept: ORTHOPEDIC SURGERY | Facility: HOSPITAL | Age: 83
End: 2023-04-14
Payer: MEDICARE

## 2023-04-14 VITALS
OXYGEN SATURATION: 97 % | HEART RATE: 70 BPM | SYSTOLIC BLOOD PRESSURE: 144 MMHG | RESPIRATION RATE: 14 BRPM | HEIGHT: 59 IN | DIASTOLIC BLOOD PRESSURE: 80 MMHG | WEIGHT: 133.6 LBS | TEMPERATURE: 98 F

## 2023-04-14 DIAGNOSIS — Z98.890 OTHER SPECIFIED POSTPROCEDURAL STATES: Chronic | ICD-10-CM

## 2023-04-14 LAB
ANION GAP SERPL CALC-SCNC: 5 MMOL/L — SIGNIFICANT CHANGE UP (ref 5–17)
BASOPHILS # BLD AUTO: 0.08 K/UL — SIGNIFICANT CHANGE UP (ref 0–0.2)
BASOPHILS NFR BLD AUTO: 0.6 % — SIGNIFICANT CHANGE UP (ref 0–2)
BLD GP AB SCN SERPL QL: SIGNIFICANT CHANGE UP
BUN SERPL-MCNC: 22 MG/DL — SIGNIFICANT CHANGE UP (ref 7–23)
CALCIUM SERPL-MCNC: 9.6 MG/DL — SIGNIFICANT CHANGE UP (ref 8.5–10.1)
CHLORIDE SERPL-SCNC: 109 MMOL/L — HIGH (ref 96–108)
CO2 SERPL-SCNC: 26 MMOL/L — SIGNIFICANT CHANGE UP (ref 22–31)
CREAT SERPL-MCNC: 0.95 MG/DL — SIGNIFICANT CHANGE UP (ref 0.5–1.3)
EGFR: 59 ML/MIN/1.73M2 — LOW
EOSINOPHIL # BLD AUTO: 0.01 K/UL — SIGNIFICANT CHANGE UP (ref 0–0.5)
EOSINOPHIL NFR BLD AUTO: 0.1 % — SIGNIFICANT CHANGE UP (ref 0–6)
GLUCOSE SERPL-MCNC: 199 MG/DL — HIGH (ref 70–99)
HCT VFR BLD CALC: 33.5 % — LOW (ref 34.5–45)
HGB BLD-MCNC: 10.4 G/DL — LOW (ref 11.5–15.5)
IMM GRANULOCYTES NFR BLD AUTO: 1.2 % — HIGH (ref 0–0.9)
LYMPHOCYTES # BLD AUTO: 26.5 % — SIGNIFICANT CHANGE UP (ref 13–44)
LYMPHOCYTES # BLD AUTO: 3.47 K/UL — HIGH (ref 1–3.3)
MCHC RBC-ENTMCNC: 26.5 PG — LOW (ref 27–34)
MCHC RBC-ENTMCNC: 31 G/DL — LOW (ref 32–36)
MCV RBC AUTO: 85.5 FL — SIGNIFICANT CHANGE UP (ref 80–100)
MONOCYTES # BLD AUTO: 0.25 K/UL — SIGNIFICANT CHANGE UP (ref 0–0.9)
MONOCYTES NFR BLD AUTO: 1.9 % — LOW (ref 2–14)
NEUTROPHILS # BLD AUTO: 9.13 K/UL — HIGH (ref 1.8–7.4)
NEUTROPHILS NFR BLD AUTO: 69.7 % — SIGNIFICANT CHANGE UP (ref 43–77)
NRBC # BLD: 0 /100 WBCS — SIGNIFICANT CHANGE UP (ref 0–0)
PLATELET # BLD AUTO: 231 K/UL — SIGNIFICANT CHANGE UP (ref 150–400)
POTASSIUM SERPL-MCNC: 4.3 MMOL/L — SIGNIFICANT CHANGE UP (ref 3.5–5.3)
POTASSIUM SERPL-SCNC: 4.3 MMOL/L — SIGNIFICANT CHANGE UP (ref 3.5–5.3)
RBC # BLD: 3.92 M/UL — SIGNIFICANT CHANGE UP (ref 3.8–5.2)
RBC # FLD: 13.1 % — SIGNIFICANT CHANGE UP (ref 10.3–14.5)
SODIUM SERPL-SCNC: 140 MMOL/L — SIGNIFICANT CHANGE UP (ref 135–145)
WBC # BLD: 13.1 K/UL — HIGH (ref 3.8–10.5)
WBC # FLD AUTO: 13.1 K/UL — HIGH (ref 3.8–10.5)

## 2023-04-14 PROCEDURE — 22612 ARTHRD PST TQ 1NTRSPC LUMBAR: CPT

## 2023-04-14 PROCEDURE — 20936 SP BONE AGRFT LOCAL ADD-ON: CPT

## 2023-04-14 PROCEDURE — 22614 ARTHRD PST TQ 1NTRSPC EA ADD: CPT

## 2023-04-14 PROCEDURE — 63048 LAM FACETEC &FORAMOT EA ADDL: CPT

## 2023-04-14 PROCEDURE — 63047 LAM FACETEC & FORAMOT LUMBAR: CPT

## 2023-04-14 PROCEDURE — 22842 INSERT SPINE FIXATION DEVICE: CPT

## 2023-04-14 PROCEDURE — 20930 SP BONE ALGRFT MORSEL ADD-ON: CPT

## 2023-04-14 DEVICE — IMPLANTABLE DEVICE: Type: IMPLANTABLE DEVICE | Status: FUNCTIONAL

## 2023-04-14 DEVICE — SURGIFLO MATRIX WITH THROMBIN KIT: Type: IMPLANTABLE DEVICE | Status: FUNCTIONAL

## 2023-04-14 DEVICE — BONE WAX 2.5G NON ABSORBABLE: Type: IMPLANTABLE DEVICE | Status: FUNCTIONAL

## 2023-04-14 DEVICE — GRAFT BONE INFUSE KIT MED: Type: IMPLANTABLE DEVICE | Status: FUNCTIONAL

## 2023-04-14 DEVICE — SCREW LOCKG OPEN TULIP RELINE 5.5MM: Type: IMPLANTABLE DEVICE | Status: FUNCTIONAL

## 2023-04-14 RX ORDER — OXYCODONE HYDROCHLORIDE 5 MG/1
1 TABLET ORAL
Qty: 42 | Refills: 0
Start: 2023-04-14 | End: 2023-04-20

## 2023-04-14 RX ORDER — DIPHENHYDRAMINE HCL 50 MG
12.5 CAPSULE ORAL EVERY 4 HOURS
Refills: 0 | Status: ACTIVE | OUTPATIENT
Start: 2023-04-14 | End: 2024-03-12

## 2023-04-14 RX ORDER — POLYETHYLENE GLYCOL 3350 17 G/17G
17 POWDER, FOR SOLUTION ORAL
Refills: 0 | Status: ACTIVE | OUTPATIENT
Start: 2023-04-14 | End: 2024-03-12

## 2023-04-14 RX ORDER — NALOXONE HYDROCHLORIDE 4 MG/.1ML
4 SPRAY NASAL
Qty: 1 | Refills: 0
Start: 2023-04-14 | End: 2023-04-14

## 2023-04-14 RX ORDER — ONDANSETRON 8 MG/1
1 TABLET, FILM COATED ORAL
Qty: 28 | Refills: 0
Start: 2023-04-14 | End: 2023-04-20

## 2023-04-14 RX ORDER — EZETIMIBE 10 MG/1
1 TABLET ORAL
Qty: 0 | Refills: 0 | DISCHARGE

## 2023-04-14 RX ORDER — SODIUM CHLORIDE 9 MG/ML
1000 INJECTION, SOLUTION INTRAVENOUS
Refills: 0 | Status: DISCONTINUED | OUTPATIENT
Start: 2023-04-14 | End: 2023-04-14

## 2023-04-14 RX ORDER — SODIUM CHLORIDE 9 MG/ML
1000 INJECTION, SOLUTION INTRAVENOUS
Refills: 0 | Status: DISCONTINUED | OUTPATIENT
Start: 2023-04-14 | End: 2023-04-17

## 2023-04-14 RX ORDER — ACETAMINOPHEN 500 MG
975 TABLET ORAL EVERY 8 HOURS
Refills: 0 | Status: ACTIVE | OUTPATIENT
Start: 2023-04-14 | End: 2024-03-12

## 2023-04-14 RX ORDER — CEFAZOLIN SODIUM 1 G
2000 VIAL (EA) INJECTION EVERY 8 HOURS
Refills: 0 | Status: COMPLETED | OUTPATIENT
Start: 2023-04-14 | End: 2023-04-15

## 2023-04-14 RX ORDER — LOVASTATIN 20 MG
1 TABLET ORAL
Qty: 0 | Refills: 0 | DISCHARGE

## 2023-04-14 RX ORDER — ACETAMINOPHEN 500 MG
3 TABLET ORAL
Qty: 0 | Refills: 0 | DISCHARGE
Start: 2023-04-14

## 2023-04-14 RX ORDER — ONDANSETRON 8 MG/1
4 TABLET, FILM COATED ORAL EVERY 6 HOURS
Refills: 0 | Status: ACTIVE | OUTPATIENT
Start: 2023-04-14 | End: 2024-03-12

## 2023-04-14 RX ORDER — CYCLOSPORINE 0.5 MG/ML
1 EMULSION OPHTHALMIC
Qty: 0 | Refills: 0 | DISCHARGE

## 2023-04-14 RX ORDER — HYDROMORPHONE HYDROCHLORIDE 2 MG/ML
1 INJECTION INTRAMUSCULAR; INTRAVENOUS; SUBCUTANEOUS
Refills: 0 | Status: DISCONTINUED | OUTPATIENT
Start: 2023-04-14 | End: 2023-04-14

## 2023-04-14 RX ORDER — SENNA PLUS 8.6 MG/1
2 TABLET ORAL
Qty: 0 | Refills: 0 | DISCHARGE
Start: 2023-04-14

## 2023-04-14 RX ORDER — SENNA PLUS 8.6 MG/1
2 TABLET ORAL AT BEDTIME
Refills: 0 | Status: ACTIVE | OUTPATIENT
Start: 2023-04-14 | End: 2024-03-12

## 2023-04-14 RX ORDER — CYCLOBENZAPRINE HYDROCHLORIDE 10 MG/1
1 TABLET, FILM COATED ORAL
Qty: 21 | Refills: 0
Start: 2023-04-14 | End: 2023-04-20

## 2023-04-14 RX ORDER — ONDANSETRON 8 MG/1
4 TABLET, FILM COATED ORAL ONCE
Refills: 0 | Status: DISCONTINUED | OUTPATIENT
Start: 2023-04-14 | End: 2023-04-14

## 2023-04-14 RX ORDER — HYDROMORPHONE HYDROCHLORIDE 2 MG/ML
0.5 INJECTION INTRAMUSCULAR; INTRAVENOUS; SUBCUTANEOUS
Refills: 0 | Status: DISCONTINUED | OUTPATIENT
Start: 2023-04-14 | End: 2023-04-14

## 2023-04-14 RX ORDER — HYDROMORPHONE HYDROCHLORIDE 2 MG/ML
0.5 INJECTION INTRAMUSCULAR; INTRAVENOUS; SUBCUTANEOUS
Refills: 0 | Status: ACTIVE | OUTPATIENT
Start: 2023-04-14 | End: 2023-04-21

## 2023-04-14 RX ORDER — SODIUM CHLORIDE 9 MG/ML
3 INJECTION INTRAMUSCULAR; INTRAVENOUS; SUBCUTANEOUS EVERY 8 HOURS
Refills: 0 | Status: DISCONTINUED | OUTPATIENT
Start: 2023-04-14 | End: 2023-04-14

## 2023-04-14 RX ORDER — CYCLOBENZAPRINE HYDROCHLORIDE 10 MG/1
10 TABLET, FILM COATED ORAL EVERY 8 HOURS
Refills: 0 | Status: ACTIVE | OUTPATIENT
Start: 2023-04-14 | End: 2024-03-12

## 2023-04-14 RX ORDER — ATORVASTATIN CALCIUM 80 MG/1
10 TABLET, FILM COATED ORAL AT BEDTIME
Refills: 0 | Status: ACTIVE | OUTPATIENT
Start: 2023-04-14 | End: 2024-03-12

## 2023-04-14 RX ORDER — OXYCODONE HYDROCHLORIDE 5 MG/1
10 TABLET ORAL EVERY 4 HOURS
Refills: 0 | Status: ACTIVE | OUTPATIENT
Start: 2023-04-14 | End: 2023-04-21

## 2023-04-14 RX ORDER — OXYCODONE HYDROCHLORIDE 5 MG/1
15 TABLET ORAL EVERY 4 HOURS
Refills: 0 | Status: ACTIVE | OUTPATIENT
Start: 2023-04-14 | End: 2023-04-21

## 2023-04-14 RX ADMIN — SENNA PLUS 2 TABLET(S): 8.6 TABLET ORAL at 21:32

## 2023-04-14 RX ADMIN — OXYCODONE HYDROCHLORIDE 10 MILLIGRAM(S): 5 TABLET ORAL at 22:30

## 2023-04-14 RX ADMIN — Medication 975 MILLIGRAM(S): at 21:31

## 2023-04-14 RX ADMIN — POLYETHYLENE GLYCOL 3350 17 GRAM(S): 17 POWDER, FOR SOLUTION ORAL at 18:01

## 2023-04-14 RX ADMIN — Medication 975 MILLIGRAM(S): at 22:30

## 2023-04-14 RX ADMIN — Medication 975 MILLIGRAM(S): at 15:54

## 2023-04-14 RX ADMIN — HYDROMORPHONE HYDROCHLORIDE 0.5 MILLIGRAM(S): 2 INJECTION INTRAMUSCULAR; INTRAVENOUS; SUBCUTANEOUS at 13:59

## 2023-04-14 RX ADMIN — ATORVASTATIN CALCIUM 10 MILLIGRAM(S): 80 TABLET, FILM COATED ORAL at 21:31

## 2023-04-14 RX ADMIN — OXYCODONE HYDROCHLORIDE 10 MILLIGRAM(S): 5 TABLET ORAL at 21:34

## 2023-04-14 RX ADMIN — Medication 975 MILLIGRAM(S): at 16:54

## 2023-04-14 RX ADMIN — Medication 100 MILLIGRAM(S): at 15:52

## 2023-04-14 RX ADMIN — CYCLOBENZAPRINE HYDROCHLORIDE 10 MILLIGRAM(S): 10 TABLET, FILM COATED ORAL at 15:55

## 2023-04-14 RX ADMIN — HYDROMORPHONE HYDROCHLORIDE 0.5 MILLIGRAM(S): 2 INJECTION INTRAMUSCULAR; INTRAVENOUS; SUBCUTANEOUS at 14:14

## 2023-04-14 NOTE — BRIEF OPERATIVE NOTE - NSICDXBRIEFPROCEDURE_GEN_ALL_CORE_FT
PROCEDURES:  Fusion, spine, lumbar, with laminectomy, posterior approach 14-Apr-2023 12:16:50  Chio Koenig

## 2023-04-14 NOTE — PHYSICAL THERAPY INITIAL EVALUATION ADULT - STRENGTHENING, PT EVAL
Patient will improve strength by 1 grade where limited weeks to improve overall functional mobility including gait, transfers, bed mobility and decrease risk of falls.

## 2023-04-14 NOTE — PHYSICAL THERAPY INITIAL EVALUATION ADULT - ADDITIONAL COMMENTS
Pt states she lives with her son (who is not able to provide assist), in a PH 4-5 EVELYN with R HR, and 1 flight of steps to 2nd floor bedroom/bathroom with L HR. Pt states she plans to recover on main level. Pt states she was able to perform functional mobility and ADL's independently without a device.

## 2023-04-14 NOTE — DISCHARGE NOTE PROVIDER - NSDCFUSCHEDAPPT_GEN_ALL_CORE_FT
[facility]  [address]  Scheduled Appointment: 05/02/2023

## 2023-04-14 NOTE — DISCHARGE NOTE PROVIDER - NSDCFUADDAPPT_GEN_ALL_CORE_FT
Follow up with your surgeon in two weeks. Call for appointment.    If you need more pain medications, call your surgeon's office. For medication refills or authorizations call [phone]    Call and schedule a follow up appointment with your primary care physician for repeat blood work (CBC and BMP) for post hospital discharge follow-up care.    Call your surgeon if you have increased redness/pain/drainage or fever. Return to ER for shortness of breath/calf tenderness.

## 2023-04-14 NOTE — DISCHARGE NOTE PROVIDER - NSDCFUADDINST_GEN_ALL_CORE_FT
No bending/lifting/twisting/pulling/pushing/carrying or driving. No blood thinners (not limited to but including) aspirin, motrin, alleve, naproxen, etc.    May shower 5 days post op.  No direct water pressure over incision.  Change dressing daily as needed with a dry clean bandage.  No bending/lifting/twisting/pulling/pushing/carrying or driving. No blood thinners (not limited to but including) aspirin, motrin, alleve, naproxen, etc.  There is a dry clean bandage covering your prineo dressing.  May shower 5 days post op.  No direct water pressure over incision.  Change dressing daily as needed with a dry clean bandage. May leave prineo dressing open to air starting 5 days post op (4/19)

## 2023-04-14 NOTE — DISCHARGE NOTE PROVIDER - CARE PROVIDER_API CALL
[facility])  Orthopaedic Surgery  [address]  [place] [address]  Phone: [phone]  Fax: [phone]  Follow Up Time:

## 2023-04-14 NOTE — PATIENT PROFILE ADULT - HISTORY OF COVID-19 VACCINATION
Yes [FreeTextEntry1] : 89 year-old woman with history of hypertension, HCV, hypothyroidism comes today with her daughter for follow-up. Acute concerns involve gradual memory loss, OA of the knees L>R, and shortness of breath with minimal exertion. In July 2018, patient noted to have right leg swelling. Evaluation by orthopedic surgeon with Doppler revealed DVT and cellulitis. Patient was started on Augmentin and Xarelto. Patient has no pleuritic chest pain. \par December 17, 2019: The patient complains of shortness of breath. The daughter admits that she may have had some salty soups or other foods. She denies any chest pains or palpitations.\par Patient has also been sent to pulmonary who also check overnight saturation studies and have r/o hypoxia as a source of fatigue and SOB. \par June 2, 2020: Patient gets more short of breath.  She gets dyspnea on exertion.  She is extremely inactive.  And maybe salt in her food.  She denies any chest pains or palpitations.  She had never gotten the echocardiogram that was ordered because of the COVID-19 pandemic.\par OA of knees have been chronic and also promotes to patients sedentary lifestyle. She uses a cane on occasion. No recent falls. On NSAIDS PRN for pain.\par \par September 15, 2020: Patient denies any chest pains or palpitations.  She is does get dyspnea on exertion but she is very inactive.  Sometimes she sighs a lot but she does not have air hunger.\par December 15, 2020: Patient gets some shortness of breath and dyspnea on exertion.  She denies any chest pains or palpitations.  She denies any dizziness or fainting.  She does have some caffeinated coffee and some salt.  She did have an argument with her daughter on the way here.\par In February 2018, patient was admitted with presyncope. Her ILR was interrogated and was without corresponding event. She was discharged without changes in her medications.\par \par December 2018 - Patient's chief complaint today is right hip pain. She has been started on Vicodin for it. She is taking it once per day. She also got a cortisone injection last week that provided mild relief.\par Labs drawn earlier today by Dr. Dickson.\par \par March 2019 - Patient has just started CBD/THC for pain relief of chronic arthritis. It is well tolerated so far, but effect may not be seen at this early time.\par \par June 2019 - In May 2019, patient was treated for gout with steroids per rheumatology. Patient admitted to [facility] in May 2019 with syncope. At that time, her furosemide was stopped and her Xarelto was discontinued. She remains on CBD for pain. She has no signs or symptoms of volume overload while off loop diuretic.\par April 13, 2021: The patient has been placed on prednisone and she has gained weight has some pedal edema.  She does report shortness of breath at rest as well as dyspnea on exertion.  Her furosemide was increased from once a week to every other day 4 days ago.  She has been having some salty foods such as soups.  She has a [employer] loop recorder and has not heard anything about reports.\par July 13, 2021: The patient gets occasional episodes of shortness of breath.. She has dyspnea on exertion but has been very inactive. She has cut out most most soups but still has salt in her diet. She denies any chest pains or palpitations.\par August 17, 2021: The patient has had 2 episodes of weakness after taking a shower.  She is taking furosemide 3 times a week.  She does  the shower as well as sitting down for part of it.\par 9/21/2021: The patient has had no further weak spells. The patient's daughter is leaving the shower door open slightly so that it gets less hot. She underwent an echocardiogram today which showed good LV systolic function and only aortic sclerosis with a minimal leak in the aortic and mitral valves. Pulmonary artery pressure was normal\par November 16, 2021:. The patient has no new symptoms.  She does complain of fatigue.  She is very inactive.  She denies any chest pains or palpitations.  Sometimes she gets short of breath.  She wishes to get a flu shot today.\par February 22, 2022: The patient complains of being tired.  She is very inactive.  She denies any chest pains or shortness of breath at rest or palpitations.\par PMD/Geriatrics: Beverly Wheeler MD [phone]\par Orthopedist: Alex Hoff MD [phone]\par Plastic Surgeon: Miguel Soto MD [phone]\par Former Cardiologist: Jose Santoyo MD [phone]\par GI: Johnny Dickson MD [phone]

## 2023-04-14 NOTE — DISCHARGE NOTE PROVIDER - NSDCCPCAREPLAN_GEN_ALL_CORE_FT
PRINCIPAL DISCHARGE DIAGNOSIS  Diagnosis: Lumbar spinal stenosis  Assessment and Plan of Treatment:

## 2023-04-14 NOTE — DISCHARGE NOTE PROVIDER - HOSPITAL COURSE
82yFemale with history of Lumbar spinal stenosis presenting for PSF L3-5 by Dr. Barclay on 4/14/2023. Risk and benefits of surgery were explained to the patient. The patient understood and agreed to proceed with surgery. Patient underwent the procedure with no intraoperative complications. Pt was brought in stable condition to the PACU. Once stable in PACU, pt was brought to the floor. During hospital stay pt was followed by Medicine, physical therapy, Home Care during this admission. Pt had an uneventful hospital course. Pt is stable for discharge to home. 82yFemale with history of Lumbar spinal stenosis presenting for PSF L3-5 by Dr. Barclay on 4/14/2023. Risk and benefits of surgery were explained to the patient. The patient understood and agreed to proceed with surgery. Patient underwent the procedure with no intraoperative complications. Pt was brought in stable condition to the PACU. Once stable in PACU,   pt was brought to the floor. During hospital stay pt was followed by physical therapy and case management during this admission. Pt had an uneventful hospital course. Pt is   stable for discharge to rehab on POD#3.

## 2023-04-14 NOTE — DISCHARGE NOTE PROVIDER - NSDCMRMEDTOKEN_GEN_ALL_CORE_FT
acetaminophen 325 mg oral tablet: 3 tab(s) orally every 8 hours as needed for pain  Calcium 500+D: 1  orally once a day  cyclobenzaprine 10 mg oral tablet: 1 tab(s) orally every 8 hours as needed for muscle spasm MDD: 3 tablets  ezetimibe 10 mg oral tablet: 1 tab(s) orally once a day (at bedtime)  lovastatin 40 mg oral tablet: 1 tab(s) orally once a day (at bedtime)  Narcan 4 mg/0.1 mL nasal spray: 4 milligram(s) intranasally once , repeat as necessary. as needed for suspected opiate overdose MDD: 0.2ml  ondansetron 4 mg oral tablet: 1 tab(s) orally every 6 hours MDD: 4 tablets  oxyCODONE 10 mg oral tablet: 1 tab(s) orally every 4 hours as needed for pain 6-10 MDD: 6 tablets  Restasis 0.05% ophthalmic emulsion: 1 drop(s) to each affected eye every 12 hours  senna leaf extract oral tablet: 2 tab(s) orally once a day (at bedtime)   acetaminophen 325 mg oral tablet: 3 tab(s) orally every 8 hours as needed for pain  aluminum hydroxide-magnesium hydroxide 200 mg-200 mg/5 mL oral suspension: 30 milliliter(s) orally every 4 hours As needed Dyspepsia  Calcium 500+D: 1  orally once a day  cyclobenzaprine 10 mg oral tablet: 1 tab(s) orally every 8 hours as needed for muscle spasm MDD: 3 tablets  ezetimibe 10 mg oral tablet: 1 tab(s) orally once a day (at bedtime)  gabapentin 300 mg oral capsule: 1 cap(s) orally 2 times a day As needed Radicular Pain  lovastatin 40 mg oral tablet: 1 tab(s) orally once a day (at bedtime)  Narcan 4 mg/0.1 mL nasal spray: 4 milligram(s) intranasally once , repeat as necessary. as needed for suspected opiate overdose MDD: 0.2ml  ondansetron 4 mg oral tablet: 1 tab(s) orally every 6 hours MDD: 4 tablets  oxyCODONE 10 mg oral tablet: 1 tab(s) orally every 4 hours as needed for pain 6-10 MDD: 6 tablets  Restasis 0.05% ophthalmic emulsion: 1 drop(s) to each affected eye every 12 hours  senna leaf extract oral tablet: 2 tab(s) orally once a day (at bedtime)

## 2023-04-14 NOTE — PATIENT PROFILE ADULT - VISION (WITH CORRECTIVE LENSES IF THE PATIENT USUALLY WEARS THEM):
ASU Locker/Partially impaired: cannot see medication labels or newsprint, but can see obstacles in path, and the surrounding layout; can count fingers at arm's length

## 2023-04-14 NOTE — DISCHARGE NOTE PROVIDER - NSDCCPTREATMENT_GEN_ALL_CORE_FT
PRINCIPAL PROCEDURE  Procedure: Fusion, spine, lumbar, with laminectomy, posterior approach  Findings and Treatment:

## 2023-04-14 NOTE — PHYSICAL THERAPY INITIAL EVALUATION ADULT - GENERAL OBSERVATIONS, REHAB EVAL
Pt found semi supine in bed in NAD, +hep lock, +SCDs, +hemovac, agreeable to PT Elsa and CLAIRE Gao made aware.

## 2023-04-15 LAB
ANION GAP SERPL CALC-SCNC: 4 MMOL/L — LOW (ref 5–17)
BASOPHILS # BLD AUTO: 0.07 K/UL — SIGNIFICANT CHANGE UP (ref 0–0.2)
BASOPHILS NFR BLD AUTO: 0.4 % — SIGNIFICANT CHANGE UP (ref 0–2)
BUN SERPL-MCNC: 20 MG/DL — SIGNIFICANT CHANGE UP (ref 7–23)
CALCIUM SERPL-MCNC: 9.2 MG/DL — SIGNIFICANT CHANGE UP (ref 8.5–10.1)
CHLORIDE SERPL-SCNC: 104 MMOL/L — SIGNIFICANT CHANGE UP (ref 96–108)
CO2 SERPL-SCNC: 30 MMOL/L — SIGNIFICANT CHANGE UP (ref 22–31)
CREAT SERPL-MCNC: 1.03 MG/DL — SIGNIFICANT CHANGE UP (ref 0.5–1.3)
EGFR: 54 ML/MIN/1.73M2 — LOW
EOSINOPHIL # BLD AUTO: 0 K/UL — SIGNIFICANT CHANGE UP (ref 0–0.5)
EOSINOPHIL NFR BLD AUTO: 0 % — SIGNIFICANT CHANGE UP (ref 0–6)
GLUCOSE SERPL-MCNC: 138 MG/DL — HIGH (ref 70–99)
HCT VFR BLD CALC: 30.1 % — LOW (ref 34.5–45)
HGB BLD-MCNC: 9.4 G/DL — LOW (ref 11.5–15.5)
IMM GRANULOCYTES NFR BLD AUTO: 0.9 % — SIGNIFICANT CHANGE UP (ref 0–0.9)
LYMPHOCYTES # BLD AUTO: 26 % — SIGNIFICANT CHANGE UP (ref 13–44)
LYMPHOCYTES # BLD AUTO: 4.18 K/UL — HIGH (ref 1–3.3)
MCHC RBC-ENTMCNC: 26.5 PG — LOW (ref 27–34)
MCHC RBC-ENTMCNC: 31.2 G/DL — LOW (ref 32–36)
MCV RBC AUTO: 84.8 FL — SIGNIFICANT CHANGE UP (ref 80–100)
MONOCYTES # BLD AUTO: 1.39 K/UL — HIGH (ref 0–0.9)
MONOCYTES NFR BLD AUTO: 8.6 % — SIGNIFICANT CHANGE UP (ref 2–14)
NEUTROPHILS # BLD AUTO: 10.31 K/UL — HIGH (ref 1.8–7.4)
NEUTROPHILS NFR BLD AUTO: 64.1 % — SIGNIFICANT CHANGE UP (ref 43–77)
NRBC # BLD: 0 /100 WBCS — SIGNIFICANT CHANGE UP (ref 0–0)
PLATELET # BLD AUTO: 233 K/UL — SIGNIFICANT CHANGE UP (ref 150–400)
POTASSIUM SERPL-MCNC: 5.1 MMOL/L — SIGNIFICANT CHANGE UP (ref 3.5–5.3)
POTASSIUM SERPL-SCNC: 5.1 MMOL/L — SIGNIFICANT CHANGE UP (ref 3.5–5.3)
RBC # BLD: 3.55 M/UL — LOW (ref 3.8–5.2)
RBC # FLD: 13.1 % — SIGNIFICANT CHANGE UP (ref 10.3–14.5)
SODIUM SERPL-SCNC: 138 MMOL/L — SIGNIFICANT CHANGE UP (ref 135–145)
WBC # BLD: 16.09 K/UL — HIGH (ref 3.8–10.5)
WBC # FLD AUTO: 16.09 K/UL — HIGH (ref 3.8–10.5)

## 2023-04-15 RX ORDER — LANOLIN ALCOHOL/MO/W.PET/CERES
3 CREAM (GRAM) TOPICAL AT BEDTIME
Refills: 0 | Status: ACTIVE | OUTPATIENT
Start: 2023-04-15 | End: 2024-03-13

## 2023-04-15 RX ORDER — ACETAMINOPHEN 500 MG
1000 TABLET ORAL ONCE
Refills: 0 | Status: ACTIVE | OUTPATIENT
Start: 2023-04-15 | End: 2024-03-13

## 2023-04-15 RX ADMIN — POLYETHYLENE GLYCOL 3350 17 GRAM(S): 17 POWDER, FOR SOLUTION ORAL at 17:56

## 2023-04-15 RX ADMIN — OXYCODONE HYDROCHLORIDE 10 MILLIGRAM(S): 5 TABLET ORAL at 21:00

## 2023-04-15 RX ADMIN — CYCLOBENZAPRINE HYDROCHLORIDE 10 MILLIGRAM(S): 10 TABLET, FILM COATED ORAL at 08:35

## 2023-04-15 RX ADMIN — SENNA PLUS 2 TABLET(S): 8.6 TABLET ORAL at 22:37

## 2023-04-15 RX ADMIN — SODIUM CHLORIDE 75 MILLILITER(S): 9 INJECTION, SOLUTION INTRAVENOUS at 00:07

## 2023-04-15 RX ADMIN — OXYCODONE HYDROCHLORIDE 10 MILLIGRAM(S): 5 TABLET ORAL at 06:58

## 2023-04-15 RX ADMIN — Medication 100 MILLIGRAM(S): at 08:07

## 2023-04-15 RX ADMIN — POLYETHYLENE GLYCOL 3350 17 GRAM(S): 17 POWDER, FOR SOLUTION ORAL at 06:02

## 2023-04-15 RX ADMIN — Medication 100 MILLIGRAM(S): at 00:07

## 2023-04-15 RX ADMIN — Medication 975 MILLIGRAM(S): at 15:00

## 2023-04-15 RX ADMIN — Medication 975 MILLIGRAM(S): at 06:02

## 2023-04-15 RX ADMIN — OXYCODONE HYDROCHLORIDE 10 MILLIGRAM(S): 5 TABLET ORAL at 20:10

## 2023-04-15 RX ADMIN — Medication 975 MILLIGRAM(S): at 06:58

## 2023-04-15 RX ADMIN — OXYCODONE HYDROCHLORIDE 10 MILLIGRAM(S): 5 TABLET ORAL at 13:00

## 2023-04-15 RX ADMIN — Medication 975 MILLIGRAM(S): at 22:37

## 2023-04-15 RX ADMIN — ATORVASTATIN CALCIUM 10 MILLIGRAM(S): 80 TABLET, FILM COATED ORAL at 22:37

## 2023-04-15 RX ADMIN — Medication 975 MILLIGRAM(S): at 23:35

## 2023-04-15 RX ADMIN — OXYCODONE HYDROCHLORIDE 10 MILLIGRAM(S): 5 TABLET ORAL at 06:01

## 2023-04-15 RX ADMIN — Medication 3 MILLIGRAM(S): at 22:37

## 2023-04-15 RX ADMIN — CYCLOBENZAPRINE HYDROCHLORIDE 10 MILLIGRAM(S): 10 TABLET, FILM COATED ORAL at 20:10

## 2023-04-15 RX ADMIN — OXYCODONE HYDROCHLORIDE 10 MILLIGRAM(S): 5 TABLET ORAL at 12:15

## 2023-04-15 RX ADMIN — Medication 975 MILLIGRAM(S): at 16:00

## 2023-04-16 LAB
ANION GAP SERPL CALC-SCNC: 1 MMOL/L — LOW (ref 5–17)
BASOPHILS # BLD AUTO: 0.09 K/UL — SIGNIFICANT CHANGE UP (ref 0–0.2)
BASOPHILS NFR BLD AUTO: 0.6 % — SIGNIFICANT CHANGE UP (ref 0–2)
BUN SERPL-MCNC: 12 MG/DL — SIGNIFICANT CHANGE UP (ref 7–23)
CALCIUM SERPL-MCNC: 9.5 MG/DL — SIGNIFICANT CHANGE UP (ref 8.5–10.1)
CHLORIDE SERPL-SCNC: 107 MMOL/L — SIGNIFICANT CHANGE UP (ref 96–108)
CO2 SERPL-SCNC: 31 MMOL/L — SIGNIFICANT CHANGE UP (ref 22–31)
CREAT SERPL-MCNC: 0.73 MG/DL — SIGNIFICANT CHANGE UP (ref 0.5–1.3)
EGFR: 82 ML/MIN/1.73M2 — SIGNIFICANT CHANGE UP
EOSINOPHIL # BLD AUTO: 0.01 K/UL — SIGNIFICANT CHANGE UP (ref 0–0.5)
EOSINOPHIL NFR BLD AUTO: 0.1 % — SIGNIFICANT CHANGE UP (ref 0–6)
GLUCOSE SERPL-MCNC: 140 MG/DL — HIGH (ref 70–99)
HCT VFR BLD CALC: 27.9 % — LOW (ref 34.5–45)
HGB BLD-MCNC: 8.8 G/DL — LOW (ref 11.5–15.5)
IMM GRANULOCYTES NFR BLD AUTO: 0.9 % — SIGNIFICANT CHANGE UP (ref 0–0.9)
LYMPHOCYTES # BLD AUTO: 23.3 % — SIGNIFICANT CHANGE UP (ref 13–44)
LYMPHOCYTES # BLD AUTO: 3.7 K/UL — HIGH (ref 1–3.3)
MCHC RBC-ENTMCNC: 26.7 PG — LOW (ref 27–34)
MCHC RBC-ENTMCNC: 31.5 G/DL — LOW (ref 32–36)
MCV RBC AUTO: 84.5 FL — SIGNIFICANT CHANGE UP (ref 80–100)
MONOCYTES # BLD AUTO: 1.5 K/UL — HIGH (ref 0–0.9)
MONOCYTES NFR BLD AUTO: 9.5 % — SIGNIFICANT CHANGE UP (ref 2–14)
NEUTROPHILS # BLD AUTO: 10.4 K/UL — HIGH (ref 1.8–7.4)
NEUTROPHILS NFR BLD AUTO: 65.6 % — SIGNIFICANT CHANGE UP (ref 43–77)
NRBC # BLD: 0 /100 WBCS — SIGNIFICANT CHANGE UP (ref 0–0)
PLATELET # BLD AUTO: 190 K/UL — SIGNIFICANT CHANGE UP (ref 150–400)
POTASSIUM SERPL-MCNC: 4.3 MMOL/L — SIGNIFICANT CHANGE UP (ref 3.5–5.3)
POTASSIUM SERPL-SCNC: 4.3 MMOL/L — SIGNIFICANT CHANGE UP (ref 3.5–5.3)
RBC # BLD: 3.3 M/UL — LOW (ref 3.8–5.2)
RBC # FLD: 13.1 % — SIGNIFICANT CHANGE UP (ref 10.3–14.5)
SODIUM SERPL-SCNC: 139 MMOL/L — SIGNIFICANT CHANGE UP (ref 135–145)
WBC # BLD: 15.85 K/UL — HIGH (ref 3.8–10.5)
WBC # FLD AUTO: 15.85 K/UL — HIGH (ref 3.8–10.5)

## 2023-04-16 RX ORDER — ACETAMINOPHEN 500 MG
1000 TABLET ORAL ONCE
Refills: 0 | Status: ACTIVE | OUTPATIENT
Start: 2023-04-16

## 2023-04-16 RX ORDER — GABAPENTIN 400 MG/1
300 CAPSULE ORAL
Refills: 0 | Status: ACTIVE | OUTPATIENT
Start: 2023-04-16 | End: 2024-03-14

## 2023-04-16 RX ORDER — BENZOCAINE AND MENTHOL 5; 1 G/100ML; G/100ML
1 LIQUID ORAL DAILY
Refills: 0 | Status: ACTIVE | OUTPATIENT
Start: 2023-04-16 | End: 2024-03-14

## 2023-04-16 RX ADMIN — OXYCODONE HYDROCHLORIDE 10 MILLIGRAM(S): 5 TABLET ORAL at 06:28

## 2023-04-16 RX ADMIN — Medication 975 MILLIGRAM(S): at 23:20

## 2023-04-16 RX ADMIN — POLYETHYLENE GLYCOL 3350 17 GRAM(S): 17 POWDER, FOR SOLUTION ORAL at 17:28

## 2023-04-16 RX ADMIN — OXYCODONE HYDROCHLORIDE 10 MILLIGRAM(S): 5 TABLET ORAL at 13:46

## 2023-04-16 RX ADMIN — OXYCODONE HYDROCHLORIDE 10 MILLIGRAM(S): 5 TABLET ORAL at 12:45

## 2023-04-16 RX ADMIN — Medication 3 MILLIGRAM(S): at 23:21

## 2023-04-16 RX ADMIN — SENNA PLUS 2 TABLET(S): 8.6 TABLET ORAL at 23:21

## 2023-04-16 RX ADMIN — OXYCODONE HYDROCHLORIDE 10 MILLIGRAM(S): 5 TABLET ORAL at 01:06

## 2023-04-16 RX ADMIN — Medication 975 MILLIGRAM(S): at 06:28

## 2023-04-16 RX ADMIN — Medication 975 MILLIGRAM(S): at 07:30

## 2023-04-16 RX ADMIN — OXYCODONE HYDROCHLORIDE 10 MILLIGRAM(S): 5 TABLET ORAL at 07:30

## 2023-04-16 RX ADMIN — OXYCODONE HYDROCHLORIDE 10 MILLIGRAM(S): 5 TABLET ORAL at 02:00

## 2023-04-16 RX ADMIN — OXYCODONE HYDROCHLORIDE 10 MILLIGRAM(S): 5 TABLET ORAL at 20:45

## 2023-04-16 RX ADMIN — POLYETHYLENE GLYCOL 3350 17 GRAM(S): 17 POWDER, FOR SOLUTION ORAL at 06:26

## 2023-04-16 RX ADMIN — OXYCODONE HYDROCHLORIDE 10 MILLIGRAM(S): 5 TABLET ORAL at 19:45

## 2023-04-16 RX ADMIN — ATORVASTATIN CALCIUM 10 MILLIGRAM(S): 80 TABLET, FILM COATED ORAL at 23:20

## 2023-04-16 RX ADMIN — GABAPENTIN 300 MILLIGRAM(S): 400 CAPSULE ORAL at 08:06

## 2023-04-17 ENCOUNTER — TRANSCRIPTION ENCOUNTER (OUTPATIENT)
Age: 83
End: 2023-04-17

## 2023-04-17 VITALS
SYSTOLIC BLOOD PRESSURE: 139 MMHG | OXYGEN SATURATION: 95 % | HEART RATE: 100 BPM | TEMPERATURE: 98 F | DIASTOLIC BLOOD PRESSURE: 78 MMHG | RESPIRATION RATE: 18 BRPM

## 2023-04-17 LAB
ANION GAP SERPL CALC-SCNC: 5 MMOL/L — SIGNIFICANT CHANGE UP (ref 5–17)
BASOPHILS # BLD AUTO: 0.08 K/UL — SIGNIFICANT CHANGE UP (ref 0–0.2)
BASOPHILS NFR BLD AUTO: 0.5 % — SIGNIFICANT CHANGE UP (ref 0–2)
BUN SERPL-MCNC: 10 MG/DL — SIGNIFICANT CHANGE UP (ref 7–23)
CALCIUM SERPL-MCNC: 9 MG/DL — SIGNIFICANT CHANGE UP (ref 8.5–10.1)
CHLORIDE SERPL-SCNC: 101 MMOL/L — SIGNIFICANT CHANGE UP (ref 96–108)
CO2 SERPL-SCNC: 28 MMOL/L — SIGNIFICANT CHANGE UP (ref 22–31)
CREAT SERPL-MCNC: 0.68 MG/DL — SIGNIFICANT CHANGE UP (ref 0.5–1.3)
EGFR: 86 ML/MIN/1.73M2 — SIGNIFICANT CHANGE UP
EOSINOPHIL # BLD AUTO: 0.06 K/UL — SIGNIFICANT CHANGE UP (ref 0–0.5)
EOSINOPHIL NFR BLD AUTO: 0.4 % — SIGNIFICANT CHANGE UP (ref 0–6)
GLUCOSE SERPL-MCNC: 124 MG/DL — HIGH (ref 70–99)
HCT VFR BLD CALC: 26.3 % — LOW (ref 34.5–45)
HGB BLD-MCNC: 8.2 G/DL — LOW (ref 11.5–15.5)
IMM GRANULOCYTES NFR BLD AUTO: 1 % — HIGH (ref 0–0.9)
LYMPHOCYTES # BLD AUTO: 23.6 % — SIGNIFICANT CHANGE UP (ref 13–44)
LYMPHOCYTES # BLD AUTO: 3.53 K/UL — HIGH (ref 1–3.3)
MCHC RBC-ENTMCNC: 26.5 PG — LOW (ref 27–34)
MCHC RBC-ENTMCNC: 31.2 G/DL — LOW (ref 32–36)
MCV RBC AUTO: 85.1 FL — SIGNIFICANT CHANGE UP (ref 80–100)
MONOCYTES # BLD AUTO: 1.32 K/UL — HIGH (ref 0–0.9)
MONOCYTES NFR BLD AUTO: 8.8 % — SIGNIFICANT CHANGE UP (ref 2–14)
NEUTROPHILS # BLD AUTO: 9.8 K/UL — HIGH (ref 1.8–7.4)
NEUTROPHILS NFR BLD AUTO: 65.7 % — SIGNIFICANT CHANGE UP (ref 43–77)
NRBC # BLD: 0 /100 WBCS — SIGNIFICANT CHANGE UP (ref 0–0)
PLATELET # BLD AUTO: 198 K/UL — SIGNIFICANT CHANGE UP (ref 150–400)
POTASSIUM SERPL-MCNC: 3.7 MMOL/L — SIGNIFICANT CHANGE UP (ref 3.5–5.3)
POTASSIUM SERPL-SCNC: 3.7 MMOL/L — SIGNIFICANT CHANGE UP (ref 3.5–5.3)
RBC # BLD: 3.09 M/UL — LOW (ref 3.8–5.2)
RBC # FLD: 13.2 % — SIGNIFICANT CHANGE UP (ref 10.3–14.5)
SODIUM SERPL-SCNC: 134 MMOL/L — LOW (ref 135–145)
WBC # BLD: 14.94 K/UL — HIGH (ref 3.8–10.5)
WBC # FLD AUTO: 14.94 K/UL — HIGH (ref 3.8–10.5)

## 2023-04-17 RX ORDER — GABAPENTIN 400 MG/1
1 CAPSULE ORAL
Qty: 0 | Refills: 0 | DISCHARGE
Start: 2023-04-17

## 2023-04-17 RX ADMIN — POLYETHYLENE GLYCOL 3350 17 GRAM(S): 17 POWDER, FOR SOLUTION ORAL at 05:06

## 2023-04-17 RX ADMIN — OXYCODONE HYDROCHLORIDE 10 MILLIGRAM(S): 5 TABLET ORAL at 13:30

## 2023-04-17 RX ADMIN — Medication 975 MILLIGRAM(S): at 05:06

## 2023-04-17 RX ADMIN — OXYCODONE HYDROCHLORIDE 10 MILLIGRAM(S): 5 TABLET ORAL at 06:00

## 2023-04-17 RX ADMIN — Medication 975 MILLIGRAM(S): at 00:20

## 2023-04-17 RX ADMIN — OXYCODONE HYDROCHLORIDE 10 MILLIGRAM(S): 5 TABLET ORAL at 05:07

## 2023-04-17 RX ADMIN — OXYCODONE HYDROCHLORIDE 10 MILLIGRAM(S): 5 TABLET ORAL at 12:42

## 2023-04-17 RX ADMIN — Medication 975 MILLIGRAM(S): at 14:15

## 2023-04-17 RX ADMIN — OXYCODONE HYDROCHLORIDE 10 MILLIGRAM(S): 5 TABLET ORAL at 16:45

## 2023-04-17 RX ADMIN — Medication 975 MILLIGRAM(S): at 06:00

## 2023-04-17 RX ADMIN — Medication 975 MILLIGRAM(S): at 13:32

## 2023-04-17 RX ADMIN — GABAPENTIN 300 MILLIGRAM(S): 400 CAPSULE ORAL at 05:07

## 2023-04-17 RX ADMIN — GABAPENTIN 300 MILLIGRAM(S): 400 CAPSULE ORAL at 16:45

## 2023-04-17 NOTE — DISCHARGE NOTE NURSING/CASE MANAGEMENT/SOCIAL WORK - NSDCFUADDAPPT_GEN_ALL_CORE_FT
Follow up with your surgeon in two weeks. Call for appointment.    If you need more pain medications, call your surgeon's office. For medication refills or authorizations call [phone]    Call and schedule a follow up appointment with your primary care physician for repeat blood work (CBC and BMP) for post hospital discharge follow-up care.    Call your surgeon if you have increased redness/pain/drainage or fever. Return to ER for shortness of breath/calf tenderness.

## 2023-04-17 NOTE — PROGRESS NOTE ADULT - SUBJECTIVE AND OBJECTIVE BOX
82yFemale s/p PSF L3-5 POD#3. Pt seen and examined in NAD. Pain controlled. Pt denies any new complaints. Pt denies CP/SOB/N/V/D/numbness/tingling/bowel or bladder dysfunction. Preop RLE pain improved - reports aggravated LLE pain.   HV 20/80    PE:   Neuro: AAOX3  Spine: Dressing c/d/i +HV. No active drainage. Prineo dressing C/D/I.  B/L UE: Skin intact. +ROM shoulder/elbow/wrist/fingers. +ok/thumbsup/fingercross signs.  strength: 5/5.  RP2+ NVI.   B/L LE: Skin intact. +ROM hip/knee/ankle/toes. Ankle Dorsi/plantarflexion: 5/5. Calf: soft, compressible and nontender. DP/PT 2+ NVI.                             8.2    14.94 )-----------( 198      ( 17 Apr 2023 07:07 )             26.3       04-17    134<L>  |  101  |  10  ----------------------------<  124<H>  3.7   |  28  |  0.68    Ca    9.0      17 Apr 2023 07:07          A/P: 82yFemale s/p PSF L3-5 POD#3.  Dressing changed - HV DC'd. New dry clean dressing applied   Pain controlled  PT: WBAT - spinal precautions   DVT ppx: SCDs   Wound care, Isometric exercises, incentive spirometry   Discharge: planning for rehab (awaiting auth)  All the above discussed and understood by pt   D/W Dr Barclay   
82yFemale s/p PSF POD1. Pt doing well at this time, in NAD and Pain controlled. Pt denies any new complaints. Pt denies CP/SOB/N/V/D/numbness/tingling/bowel or bladder dysfunction. (+) Gong     PE:     Spine: Dressing c/d/i +HV   B/L UE: Skin intact. +ROM shoulder/elbow/wrist/fingers. +ok/thumbsup/fingercross signs.  strength: 5/5.  RP2+ NVI.   B/L LE: Skin intact. +ROM hip/knee/ankle/toes. Ankle Dorsi/plantarflexion: 5/5. Calf: soft, compressible and nontender. DP/PT 2+ NVI.           VITAL SIGNS:  T(C): 36.7 (04-15-23 @ 05:28), Max: 36.8 (04-14-23 @ 13:45)  HR: 111 (04-15-23 @ 05:28) (86 - 111)  BP: 143/82 (04-15-23 @ 05:28) (117/72 - 146/72)  RR: 18 (04-15-23 @ 05:28) (11 - 18)  SpO2: 98% (04-15-23 @ 05:28) (95% - 100%)      LABS:                        9.4    16.09 )-----------( 233      ( 15 Apr 2023 05:40 )             30.1     04-15    138  |  104  |  20  ----------------------------<  138<H>  5.1   |  30  |  1.03    Ca    9.2      15 Apr 2023 05:40            A/P: 82yFemale s/p PSF L3-5 POD1, stable  Discontinue Gong in am 4/15/23  Monitor & Record drain output every 8 hours  Pain control prn  PT: WBAT - spinal precautions   DVT ppx: SCDs and ambulation  Wound care, Isometric exercises, incentive spirometry   Discharge: planning Home once tolerating OOB and ambulating  All the above discussed and understood by pt   
Post op check  82yFemale s/p PSF under general anesthesia. Pt tolerated procedure well without any intra-op complications. Pt doing well at this time, in NAD and Pain controlled. Pt denies any new complaints. Pt denies CP/SOB/N/V/D/numbness/tingling/bowel or bladder dysfunction. (+) Gong     PE:     Spine: Dressing c/d/i +HV   B/L UE: Skin intact. +ROM shoulder/elbow/wrist/fingers. +ok/thumbsup/fingercross signs.  strength: 5/5.  RP2+ NVI.   B/L LE: Skin intact. +ROM hip/knee/ankle/toes. Ankle Dorsi/plantarflexion: 5/5. Calf: soft, compressible and nontender. DP/PT 2+ NVI.                               10.4   13.10 )-----------( 231      ( 14 Apr 2023 12:30 )             33.5       04-14    140  |  109<H>  |  22  ----------------------------<  199<H>  4.3   |  26  |  0.95    Ca    9.6      14 Apr 2023 12:30          A/P: 82yFemale s/p PSF L3-5  Discontinue Gong in am 4/15/23  Monitor & Record drain output every 8 hours  Pain control prn  PT: WBAT - spinal precautions   DVT ppx: SCDs and ambulation  Wound care, Isometric exercises, incentive spirometry   Discharge: planning Home once tolerating OOB and ambulating  All the above discussed and understood by pt   
82yFemale s/p PSF POD2. Pt doing well at this time, in NAD and Pain controlled. Pt denies any new complaints. Pt denies CP/SOB/N/V/D/numbness/tingling/bowel or bladder dysfunction.     PE:     Spine: Dressing c/d/i +HV   B/L UE: Skin intact. +ROM shoulder/elbow/wrist/fingers. +ok/thumbsup/fingercross signs.  strength: 5/5.  RP2+ NVI.   B/L LE: Skin intact. +ROM hip/knee/ankle/toes. Ankle Dorsi/plantarflexion: 5/5. Calf: soft, compressible and nontender. DP/PT 2+ NVI.       LABS:                        8.8    15.85 )-----------( 190      ( 16 Apr 2023 07:15 )             27.9     04-15    138  |  104  |  20  ----------------------------<  138<H>  5.1   |  30  |  1.03    Ca    9.2      15 Apr 2023 05:40      VITAL SIGNS:  T(C): 37.5 (04-16-23 @ 05:30), Max: 37.5 (04-16-23 @ 05:30)  HR: 103 (04-16-23 @ 05:30) (84 - 108)  BP: 138/84 (04-16-23 @ 05:30) (113/78 - 143/89)  RR: 16 (04-16-23 @ 05:30) (16 - 18)  SpO2: 95% (04-16-23 @ 05:30) (94% - 97%)        A/P: 82yFemale s/p PSF L3-5 POD2, stable  Gong Discontinued  Monitor & Record drain output every 8 hours  Pain control prn  PT: WBAT - spinal precautions   DVT ppx: SCDs and ambulation  Wound care, Isometric exercises, incentive spirometry   Discharge: DONYA  All the above discussed and understood by pt

## 2023-04-17 NOTE — DISCHARGE NOTE NURSING/CASE MANAGEMENT/SOCIAL WORK - NSDCPEFALRISK_GEN_ALL_CORE
For information on Fall & Injury Prevention, visit: [email or web address] OR  [email or web address] OR  https://www.cdc.gov/steadi/patient.html

## 2023-04-17 NOTE — DISCHARGE NOTE NURSING/CASE MANAGEMENT/SOCIAL WORK - PATIENT PORTAL LINK FT
You can access the FollowMyHealth Patient Portal offered by [facility] by registering at the following website: [email or web address]. By joining [email or web address]’s FollowMyHealth portal, you will also be able to view your health information using other applications (apps) compatible with our system.

## 2023-04-18 ENCOUNTER — NON-APPOINTMENT (OUTPATIENT)
Age: 83
End: 2023-04-18

## 2023-04-19 ENCOUNTER — OUTPATIENT (OUTPATIENT)
Dept: OUTPATIENT SERVICES | Facility: HOSPITAL | Age: 83
LOS: 1 days | Discharge: HOME | End: 2023-04-19
Payer: MEDICARE

## 2023-04-19 DIAGNOSIS — I82.403 ACUTE EMBOLISM AND THROMBOSIS OF UNSPECIFIED DEEP VEINS OF LOWER EXTREMITY, BILATERAL: ICD-10-CM

## 2023-04-19 DIAGNOSIS — Z98.890 OTHER SPECIFIED POSTPROCEDURAL STATES: Chronic | ICD-10-CM

## 2023-04-19 PROCEDURE — 71045 X-RAY EXAM CHEST 1 VIEW: CPT | Mod: 26

## 2023-04-19 PROCEDURE — 93970 EXTREMITY STUDY: CPT | Mod: 26

## 2023-04-21 DIAGNOSIS — M43.17 SPONDYLOLISTHESIS, LUMBOSACRAL REGION: ICD-10-CM

## 2023-04-21 DIAGNOSIS — M54.17 RADICULOPATHY, LUMBOSACRAL REGION: ICD-10-CM

## 2023-04-21 DIAGNOSIS — M48.061 SPINAL STENOSIS, LUMBAR REGION WITHOUT NEUROGENIC CLAUDICATION: ICD-10-CM

## 2023-04-21 DIAGNOSIS — E78.5 HYPERLIPIDEMIA, UNSPECIFIED: ICD-10-CM

## 2023-04-21 DIAGNOSIS — Z79.899 OTHER LONG TERM (CURRENT) DRUG THERAPY: ICD-10-CM

## 2023-05-02 ENCOUNTER — APPOINTMENT (OUTPATIENT)
Dept: ORTHOPEDIC SURGERY | Facility: CLINIC | Age: 83
End: 2023-05-02
Payer: MEDICARE

## 2023-05-02 VITALS — WEIGHT: 131 LBS | HEIGHT: 58 IN | BODY MASS INDEX: 27.5 KG/M2

## 2023-05-02 PROCEDURE — 72100 X-RAY EXAM L-S SPINE 2/3 VWS: CPT

## 2023-05-02 PROCEDURE — 99024 POSTOP FOLLOW-UP VISIT: CPT

## 2023-05-02 NOTE — PHYSICAL EXAM
[Implants in position] : Implants in position [No loosening of hardware] : No loosening of hardware [de-identified] : lumbar- she is in wheelchair for ease, she is able with assistance to get up from wheelchair and transfer to xray table. \par incision is scabbed over, sensation into the legs is intact

## 2023-05-02 NOTE — ASSESSMENT
[FreeTextEntry1] : continue with the therapy in rehab.\par She has h/o osteoporosis\par letter of med for Bone stim device in post op course\par f/u 4 weeks at that point would get new xrays and likely start on out pt therapy

## 2023-05-02 NOTE — HISTORY OF PRESENT ILLNESS
[Lower back] : lower back [10] : 10 [Radiating] : radiating [Sharp] : sharp [Shooting] : shooting [Constant] : constant [Sleep] : sleep [Nothing helps with pain getting better] : Nothing helps with pain getting better [de-identified] : 5-2-23- DOS: 4/14/23 Lumbar l3-4 laminotomy L5 hemilaminectomy with posterior fusion and autograft L3-4-5\par presently in Rehab facility doing therapy with gait training with walker. \par she is on gabapentin which has been making her tired during the day and oxycodone and apap for pain. notes diminished sensation over the anterior left thigh to the knee compared to the contralateral side. \par  [] : no [FreeTextEntry5] : Pt here for PO#1. Feeling dizzy; numbness in both thighs. Worse on left.  [FreeTextEntry7] : b/l legs [de-identified] : MRI L Spine

## 2023-05-08 ENCOUNTER — APPOINTMENT (OUTPATIENT)
Dept: ORTHOPEDIC SURGERY | Facility: CLINIC | Age: 83
End: 2023-05-08
Payer: MEDICARE

## 2023-05-08 VITALS — WEIGHT: 131 LBS | BODY MASS INDEX: 27.5 KG/M2 | HEIGHT: 58 IN

## 2023-05-08 PROCEDURE — 99024 POSTOP FOLLOW-UP VISIT: CPT

## 2023-05-08 PROCEDURE — 72100 X-RAY EXAM L-S SPINE 2/3 VWS: CPT

## 2023-05-08 NOTE — PHYSICAL EXAM
[de-identified] : incision c/d and healed. decreased sensation to touch L anterior thigh (L4 dermatome), some distal L5 dermatome. ambulating with walker. Full strength in bilateral LE.

## 2023-05-08 NOTE — ASSESSMENT
[FreeTextEntry1] : No evidence of mechanical cause of pain, probable inflammation. Will maintain conservative care. \par \par construct stable versus last set of xrays; numbness is not outside the realm of possibility for this early stage in her recovery

## 2023-05-08 NOTE — HISTORY OF PRESENT ILLNESS
[Lower back] : lower back [10] : 10 [Radiating] : radiating [Sharp] : sharp [Shooting] : shooting [Throbbing] : throbbing [Constant] : constant [Sleep] : sleep [Nothing helps with pain getting better] : Nothing helps with pain getting better [de-identified] : s/p open laminectomy and fusion L3-5 4/14/23 - states she has complete numbness in the L anterior thigh to knee and into the middle toes. Some on the R but not as much as on the left side. taking gabapentin, cyclobenzaprine and tylenol. Unable to tolerate the pain meds. ambulating with walker.  [] : no [FreeTextEntry5] : Pt here for PO#2. No changes in pain level since last visit. Left leg is completely numb down to toes; throbs constantly & worse at night. Unable to sleep. Ambulates with walker. No longer @ rehab.  [FreeTextEntry6] : numbness [FreeTextEntry7] : left leg [de-identified] : MRI L Spine

## 2023-05-30 ENCOUNTER — APPOINTMENT (OUTPATIENT)
Dept: ORTHOPEDIC SURGERY | Facility: CLINIC | Age: 83
End: 2023-05-30
Payer: MEDICARE

## 2023-05-30 VITALS — HEIGHT: 58 IN | WEIGHT: 131 LBS | BODY MASS INDEX: 27.5 KG/M2

## 2023-05-30 PROCEDURE — 20974 ESTIM AID BONE HEALG N-INVAS: CPT | Mod: 58

## 2023-05-30 PROCEDURE — 72100 X-RAY EXAM L-S SPINE 2/3 VWS: CPT

## 2023-05-30 PROCEDURE — 99024 POSTOP FOLLOW-UP VISIT: CPT

## 2023-05-30 NOTE — ASSESSMENT
[FreeTextEntry1] : fitted for bone fusion stimulator ;  to be worn for 30 minutes per day to enhance healing rate;

## 2023-05-30 NOTE — HISTORY OF PRESENT ILLNESS
[Lower back] : lower back [10] : 10 [Radiating] : radiating [Sharp] : sharp [Shooting] : shooting [Throbbing] : throbbing [Constant] : constant [Sleep] : sleep [Nothing helps with pain getting better] : Nothing helps with pain getting better [de-identified] : left leg has been numb since the surgery;  there is a little left sided knee bump\par numb area was upper thigh, now its closer to the knee now and to the level of the ankle ;  feet feel alright;  [] : no [FreeTextEntry5] : Pt here for PO#3. No changes in pain level since last visit. Feels pins/needles in upper left thigh; numbness in certain parts of leg; still worse at night. Notices swelling in front of leg since last visit. Ambulating w/cane. Needs refill on oxycodone.  [FreeTextEntry6] : numbness [FreeTextEntry7] : left leg [de-identified] : MRI L Spine

## 2023-06-07 ENCOUNTER — LABORATORY RESULT (OUTPATIENT)
Age: 83
End: 2023-06-07

## 2023-06-07 ENCOUNTER — APPOINTMENT (OUTPATIENT)
Dept: INTERNAL MEDICINE | Facility: CLINIC | Age: 83
End: 2023-06-07
Payer: MEDICARE

## 2023-06-07 VITALS
OXYGEN SATURATION: 98 % | HEIGHT: 58 IN | DIASTOLIC BLOOD PRESSURE: 92 MMHG | HEART RATE: 88 BPM | BODY MASS INDEX: 27.08 KG/M2 | WEIGHT: 129 LBS | SYSTOLIC BLOOD PRESSURE: 150 MMHG

## 2023-06-07 DIAGNOSIS — Z12.11 ENCOUNTER FOR SCREENING FOR MALIGNANT NEOPLASM OF COLON: ICD-10-CM

## 2023-06-07 DIAGNOSIS — R30.0 DYSURIA: ICD-10-CM

## 2023-06-07 DIAGNOSIS — E11.9 TYPE 2 DIABETES MELLITUS W/OUT COMPLICATIONS: ICD-10-CM

## 2023-06-07 DIAGNOSIS — Z87.39 PERSONAL HISTORY OF OTHER DISEASES OF THE MUSCULOSKELETAL SYSTEM AND CONNECTIVE TISSUE: ICD-10-CM

## 2023-06-07 DIAGNOSIS — E78.5 HYPERLIPIDEMIA, UNSPECIFIED: ICD-10-CM

## 2023-06-07 DIAGNOSIS — D64.9 ANEMIA, UNSPECIFIED: ICD-10-CM

## 2023-06-07 LAB
ALBUMIN SERPL ELPH-MCNC: 4.3 G/DL
ALP BLD-CCNC: 127 U/L
ALT SERPL-CCNC: 5 U/L
ANION GAP SERPL CALC-SCNC: 14 MMOL/L
APPEARANCE: ABNORMAL
AST SERPL-CCNC: 16 U/L
BILIRUB SERPL-MCNC: 0.2 MG/DL
BILIRUB UR QL STRIP: NEGATIVE
BILIRUBIN URINE: NEGATIVE
BLOOD URINE: ABNORMAL
BUN SERPL-MCNC: 13 MG/DL
CALCIUM SERPL-MCNC: 10.5 MG/DL
CHLORIDE SERPL-SCNC: 106 MMOL/L
CHOLEST SERPL-MCNC: 141 MG/DL
CO2 SERPL-SCNC: 26 MMOL/L
COLOR: YELLOW
CREAT SERPL-MCNC: 0.7 MG/DL
EGFR: 85 ML/MIN/1.73M2
ESTIMATED AVERAGE GLUCOSE: 126 MG/DL
GLUCOSE QUALITATIVE U: NEGATIVE MG/DL
GLUCOSE SERPL-MCNC: 105 MG/DL
GLUCOSE UR-MCNC: NEGATIVE
HBA1C MFR BLD HPLC: 6 %
HCG UR QL: 0.2 EU/DL
HCT VFR BLD CALC: 35.3 %
HDLC SERPL-MCNC: 58 MG/DL
HGB BLD-MCNC: 10.5 G/DL
HGB UR QL STRIP.AUTO: NORMAL
KETONES UR-MCNC: NEGATIVE
KETONES URINE: NEGATIVE MG/DL
LDLC SERPL CALC-MCNC: 62 MG/DL
LEUKOCYTE ESTERASE UR QL STRIP: NORMAL
LEUKOCYTE ESTERASE URINE: ABNORMAL
MCHC RBC-ENTMCNC: 25.1 PG
MCHC RBC-ENTMCNC: 29.7 GM/DL
MCV RBC AUTO: 84.2 FL
NITRITE UR QL STRIP: POSITIVE
NITRITE URINE: POSITIVE
NONHDLC SERPL-MCNC: 83 MG/DL
PH UR STRIP: 6
PH URINE: 6
PLATELET # BLD AUTO: 324 K/UL
POTASSIUM SERPL-SCNC: 5 MMOL/L
PROT SERPL-MCNC: 6.7 G/DL
PROT UR STRIP-MCNC: NEGATIVE
PROTEIN URINE: NEGATIVE MG/DL
RBC # BLD: 4.19 M/UL
RBC # FLD: 13.5 %
SODIUM SERPL-SCNC: 146 MMOL/L
SP GR UR STRIP: 1.02
SPECIFIC GRAVITY URINE: 1.02
TRIGL SERPL-MCNC: 107 MG/DL
UROBILINOGEN URINE: 0.2 MG/DL
WBC # FLD AUTO: 7.57 K/UL

## 2023-06-07 PROCEDURE — 81003 URINALYSIS AUTO W/O SCOPE: CPT | Mod: QW

## 2023-06-07 PROCEDURE — 99214 OFFICE O/P EST MOD 30 MIN: CPT | Mod: 25

## 2023-06-07 PROCEDURE — 36415 COLL VENOUS BLD VENIPUNCTURE: CPT

## 2023-06-07 RX ORDER — NITROFURANTOIN MACROCRYSTALS 100 MG/1
100 CAPSULE ORAL
Qty: 14 | Refills: 0 | Status: ACTIVE | COMMUNITY
Start: 2023-06-07 | End: 1900-01-01

## 2023-06-07 NOTE — PHYSICAL EXAM
[Normal Oropharynx] : the oropharynx was normal [Normal Nasal Mucosa] : the nasal mucosa was normal [No JVD] : no jugular venous distention [No Lymphadenopathy] : no lymphadenopathy [Supple] : supple [de-identified] : cerumen impaction at left ear

## 2023-06-07 NOTE — REVIEW OF SYSTEMS
[Dysuria] : dysuria [Nocturia] : nocturia [Frequency] : frequency [FreeTextEntry4] : clogged left ear [de-identified] : dryness of the skin [FreeTextEntry1] : Mild foot swelling b/l

## 2023-06-07 NOTE — HISTORY OF PRESENT ILLNESS
[FreeTextEntry1] : New patient to me\par Came with c/o dysuria [de-identified] : Patient is 83 year female  with PMH of Diet controlled DM type 2 , Hyperlipidemia, came today  with c/o dysuria after recent Back surgery in 04/14/2023\par \par C/o clogged left ear- h/o recurrent cerumen impaction\par

## 2023-06-07 NOTE — ASSESSMENT
[FreeTextEntry1] : The patients complete medical, family and social history was documented and reviewed with the patient. The patients medications were identified and also reviewed with the patient as well as any allergies to any medications. All active and previous medical problems were identified and discussed with the patient. Any new problems were documented.\par Recommend to do lab work for further management with chronic condition

## 2023-06-11 LAB
CREAT SPEC-SCNC: 94 MG/DL
MICROALBUMIN 24H UR DL<=1MG/L-MCNC: 1.7 MG/DL
MICROALBUMIN/CREAT 24H UR-RTO: 18 MG/G

## 2023-07-11 ENCOUNTER — APPOINTMENT (OUTPATIENT)
Dept: ORTHOPEDIC SURGERY | Facility: CLINIC | Age: 83
End: 2023-07-11
Payer: MEDICARE

## 2023-07-11 VITALS — HEIGHT: 58 IN | WEIGHT: 129 LBS | BODY MASS INDEX: 27.08 KG/M2

## 2023-07-11 PROCEDURE — 72100 X-RAY EXAM L-S SPINE 2/3 VWS: CPT

## 2023-07-11 PROCEDURE — 99024 POSTOP FOLLOW-UP VISIT: CPT

## 2023-07-11 RX ORDER — HYDROCODONE BITARTRATE AND ACETAMINOPHEN 10; 325 MG/1; MG/1
10-325 TABLET ORAL TWICE DAILY
Qty: 42 | Refills: 0 | Status: ACTIVE | COMMUNITY
Start: 2023-02-28 | End: 1900-01-01

## 2023-07-13 NOTE — HISTORY OF PRESENT ILLNESS
[Lower back] : lower back [Radiating] : radiating [Sharp] : sharp [Shooting] : shooting [Throbbing] : throbbing [Constant] : constant [Sleep] : sleep [Nothing helps with pain getting better] : Nothing helps with pain getting better [7] : 7 [de-identified] : 7/11/23 Patient presents today in follow up s/p Lumbar l3-L4 Laminotomy, L5 hemilaminectomy with posterior fusion and autograft L3-4-5 on 4/14/23. \par Patient reports persistent LLE medial numbness, new after surgery with no improvement and persistent low back pain. Patient has completed 3 weeks of PT now transitioned to HEP. Patient continues to take Norco prn. Patient continues to utilize bone stimulator and LSO brace. \par Xrays completed today of lumbar spine construct in good place [] : no [FreeTextEntry5] : Pt here for PO. Still feels pain in lower left leg. No changes since last visit.  [FreeTextEntry6] : numbness [FreeTextEntry7] : left leg [de-identified] : MRI L Spine

## 2023-07-13 NOTE — PHYSICAL EXAM
[de-identified] : 5/5 B Ta/EHL, Quads, hamstrings, IP\par SILT\par Pulses 2+\par \par incision c/d, healing well

## 2023-07-13 NOTE — ASSESSMENT
[FreeTextEntry1] : Patient presents today in post op follow up\par Will renew Norco\par Continue bone stim as directed\par Continue LSO \par Contniue HEP\par \par pleased that she is no longer in the severe pain that interfered with mobility but has some ongoing concerns about residual leg symptoms;  but those should begin to resolve further

## 2023-08-08 ENCOUNTER — APPOINTMENT (OUTPATIENT)
Dept: ORTHOPEDIC SURGERY | Facility: CLINIC | Age: 83
End: 2023-08-08
Payer: MEDICARE

## 2023-08-08 VITALS — WEIGHT: 129 LBS | BODY MASS INDEX: 27.08 KG/M2 | HEIGHT: 58 IN

## 2023-08-08 PROCEDURE — 99213 OFFICE O/P EST LOW 20 MIN: CPT

## 2023-08-08 PROCEDURE — 72100 X-RAY EXAM L-S SPINE 2/3 VWS: CPT

## 2023-08-08 NOTE — PHYSICAL EXAM
[de-identified] : LSPINE Inspection: no defects, deformity Palpation: No tenderness or spasm in bilateral and lumbar paraspinal musculature ROM: diminished all planes Motor: no focal deficit  Strength: 5/5 bilateral hip flexors, knee extensors, ankle dorsiflexors, EHL, ankle plantarflexors Sensation I LT  - SLR B/L  Gait mildly antalgic,

## 2023-08-08 NOTE — ASSESSMENT
[FreeTextEntry1] : S/P Lumbar laminectomy 4 months. Attended PT postop but d/c due to exercises were focused more on leg. Doing HEP, preop symptoms resolving; some residual N/T in RLE calf; xrays today: construct stable; Resume PT to strengthen core; F/up in 6 weeks.

## 2023-09-19 ENCOUNTER — APPOINTMENT (OUTPATIENT)
Dept: ORTHOPEDIC SURGERY | Facility: CLINIC | Age: 83
End: 2023-09-19
Payer: MEDICARE

## 2023-09-19 VITALS — HEIGHT: 58 IN | WEIGHT: 129 LBS | BODY MASS INDEX: 27.08 KG/M2

## 2023-09-19 PROCEDURE — 99213 OFFICE O/P EST LOW 20 MIN: CPT

## 2023-09-19 PROCEDURE — 72100 X-RAY EXAM L-S SPINE 2/3 VWS: CPT

## 2023-11-16 ENCOUNTER — APPOINTMENT (OUTPATIENT)
Dept: ORTHOPEDIC SURGERY | Facility: CLINIC | Age: 83
End: 2023-11-16
Payer: MEDICARE

## 2023-11-16 PROCEDURE — 99214 OFFICE O/P EST MOD 30 MIN: CPT

## 2023-11-16 PROCEDURE — 73564 X-RAY EXAM KNEE 4 OR MORE: CPT | Mod: 50

## 2023-11-17 RX ORDER — HYALURONATE SODIUM 20 MG/2 ML
20 SYRINGE (ML) INTRAARTICULAR
Qty: 6 | Refills: 0 | Status: ACTIVE | COMMUNITY
Start: 2023-11-17 | End: 1900-01-01

## 2023-12-06 ENCOUNTER — APPOINTMENT (OUTPATIENT)
Dept: ORTHOPEDIC SURGERY | Facility: CLINIC | Age: 83
End: 2023-12-06
Payer: MEDICARE

## 2023-12-06 VITALS — BODY MASS INDEX: 27.08 KG/M2 | HEIGHT: 58 IN | WEIGHT: 129 LBS

## 2023-12-06 PROCEDURE — 20611 DRAIN/INJ JOINT/BURSA W/US: CPT | Mod: 50

## 2023-12-06 PROCEDURE — 99213 OFFICE O/P EST LOW 20 MIN: CPT | Mod: 25

## 2023-12-14 ENCOUNTER — APPOINTMENT (OUTPATIENT)
Dept: ORTHOPEDIC SURGERY | Facility: CLINIC | Age: 83
End: 2023-12-14
Payer: MEDICARE

## 2023-12-14 VITALS — BODY MASS INDEX: 27.08 KG/M2 | WEIGHT: 129 LBS | HEIGHT: 58 IN

## 2023-12-14 PROCEDURE — 99024 POSTOP FOLLOW-UP VISIT: CPT

## 2023-12-14 PROCEDURE — 20611 DRAIN/INJ JOINT/BURSA W/US: CPT | Mod: LT

## 2023-12-14 NOTE — PHYSICAL EXAM
[Bilateral] : knee bilaterally [NL (0)] : extension 0 degrees [5___] : hamstring 5[unfilled]/5 [Negative] : negative Colby's [] : patient ambulates without assistive device [TWNoteComboBox7] : flexion 110 degrees

## 2023-12-14 NOTE — HISTORY OF PRESENT ILLNESS
[2] : 2 [Euflexxa] : Euflexxa [de-identified] : Here for f/u knees for Euflexxa injections.  [] : no [TWNoteComboBox1] : 20%

## 2023-12-14 NOTE — PROCEDURE
[Large Joint Injection] : Large joint injection [Bilateral] : bilaterally of the [Knee] : knee [Pain] : pain [Inflammation] : inflammation [X-ray evidence of Osteoarthritis on this or prior visit] : x-ray evidence of Osteoarthritis on this or prior visit [Alcohol] : alcohol [Betadine] : betadine [Ethyl Chloride sprayed topically] : ethyl chloride sprayed topically [Sterile technique used] : sterile technique used [Euflexxa(20mg)] : 20mg of Euflexxa [#2] : series #2 [] : Patient tolerated procedure well [Call if redness, pain or fever occur] : call if redness, pain or fever occur [Apply ice for 15min out of every hour for the next 12-24 hours as tolerated] : apply ice for 15 minutes out of every hour for the next 12-24 hours as tolerated [Patient was advised to rest the joint(s) for ____ days] : patient was advised to rest the joint(s) for [unfilled] days [Previous OTC use and PT nontherapeutic] : patient has tried OTC's including aspirin, Ibuprofen, Aleve, etc or prescription NSAIDS, and/or exercises at home and/or physical therapy without satisfactory response [Patient had decreased mobility in the joint] : patient had decreased mobility in the joint [Risks, benefits, alternatives discussed / Verbal consent obtained] : the risks benefits, and alternatives have been discussed, and verbal consent was obtained [Precise injection in area of tear] : precise injection in area of tear [All ultrasound images have been permanently captured and stored accordingly in our picture archiving and communication system] : All ultrasound images have been permanently captured and stored accordingly in our picture archiving and communication system [Visualization of the needle and placement of injection was performed without complication] : visualization of the needle and placement of injection was performed without complication

## 2023-12-14 NOTE — DISCUSSION/SUMMARY
[de-identified] : General Dx Discussion The patient was advised of the diagnosis. The natural history of the pathology was explained in full to the patient in layman's terms. All questions were answered. The risks and benefits of surgical and non-surgical treatment alternatives were explained in full to the patient.  Case Discussed. Euflexxa tolerated well. f/u 1 week to continue series.    Entered by Kathleen NGUYEN acting as scribe for Dr. Logan Price MD

## 2023-12-19 ENCOUNTER — APPOINTMENT (OUTPATIENT)
Dept: ORTHOPEDIC SURGERY | Facility: CLINIC | Age: 83
End: 2023-12-19
Payer: MEDICARE

## 2023-12-19 VITALS — BODY MASS INDEX: 27.08 KG/M2 | HEIGHT: 58 IN | WEIGHT: 129 LBS

## 2023-12-19 DIAGNOSIS — M54.50 LOW BACK PAIN, UNSPECIFIED: ICD-10-CM

## 2023-12-19 PROCEDURE — 99213 OFFICE O/P EST LOW 20 MIN: CPT

## 2023-12-19 PROCEDURE — 72100 X-RAY EXAM L-S SPINE 2/3 VWS: CPT

## 2023-12-19 NOTE — IMAGING
[Implants in position] : Implants in position [No loosening of hardware] : No loosening of hardware [Fusion intact] : Fusion intact [de-identified] : LSPINE Inspection: no defects, deformity Palpation: No tenderness or spasm in bilateral and lumbar paraspinal musculature ROM: Full with stiffness.  Motor: no focal deficit  Strength: 5/5 bilateral hip flexors, knee extensors, ankle dorsiflexors, EHL, ankle plantarflexors Sensation I LT  - SLR B/L  Gait non antalgic,

## 2023-12-19 NOTE — HISTORY OF PRESENT ILLNESS
[de-identified] : DOS: 4/14/23 S/p L3-4 laminotomy, L5 hemilaminotomy w/ posterior fusion.   12/19/23 Presents today 6 months s/p  S/p L3-4 laminotomy, L5 hemilaminotomy w/ posterior fusion. Patient reports she continues to have low back pain but denies radicular symptoms. Completed PT, no longer taking medications for pain. Worsens with activity.   09/19/2023 Here for a f/u of lower back. 4 months s/p laminectomy. Reports radicular complaints preoperative have resolved. States back remains sore and stiff. Difficult changing positions. Taking pain meds PRN. Currently in PT.   08/08/2023 Here for a f/u of lower back. Residual RLE N/T in calf. Overall doing well.   7/11/23 Patient presents today in follow up s/p Lumbar l3-L4 Laminotomy, L5 hemilaminectomy with posterior fusion and autograft L3-4-5 on 4/14/23. Patient reports persistent LLE medial numbness, new after surgery with no improvement and persistent low back pain. Patient has completed 3 weeks of PT now transitioned to HEP. Patient continues to take Norco prn. Patient continues to utilize bone stimulator and LSO brace.   [FreeTextEntry5] : Follow Up- L Spine. Has been doing well since sx.

## 2023-12-19 NOTE — ASSESSMENT
[FreeTextEntry1] : 5 months s/p L3-4 laminectomy and fusion. LS xrays today: construct stable. Attending PT. Remains with residual back soreness, overall radicular complaints have resolved. Will complete PT and transition to HEP. F/up in 3 months.

## 2023-12-21 ENCOUNTER — APPOINTMENT (OUTPATIENT)
Dept: ORTHOPEDIC SURGERY | Facility: CLINIC | Age: 83
End: 2023-12-21
Payer: MEDICARE

## 2023-12-21 VITALS — HEIGHT: 58 IN | BODY MASS INDEX: 27.08 KG/M2 | WEIGHT: 129 LBS

## 2023-12-21 PROCEDURE — 20611 DRAIN/INJ JOINT/BURSA W/US: CPT | Mod: 50

## 2023-12-21 PROCEDURE — 99024 POSTOP FOLLOW-UP VISIT: CPT

## 2023-12-21 NOTE — PROCEDURE
[Large Joint Injection] : Large joint injection [Bilateral] : bilaterally of the [Knee] : knee [Pain] : pain [Inflammation] : inflammation [X-ray evidence of Osteoarthritis on this or prior visit] : x-ray evidence of Osteoarthritis on this or prior visit [Alcohol] : alcohol [Betadine] : betadine [Ethyl Chloride sprayed topically] : ethyl chloride sprayed topically [Sterile technique used] : sterile technique used [Euflexxa(20mg)] : 20mg of Euflexxa [#3] : series #3 [] : Patient tolerated procedure well [Call if redness, pain or fever occur] : call if redness, pain or fever occur [Apply ice for 15min out of every hour for the next 12-24 hours as tolerated] : apply ice for 15 minutes out of every hour for the next 12-24 hours as tolerated [Patient was advised to rest the joint(s) for ____ days] : patient was advised to rest the joint(s) for [unfilled] days [Previous OTC use and PT nontherapeutic] : patient has tried OTC's including aspirin, Ibuprofen, Aleve, etc or prescription NSAIDS, and/or exercises at home and/or physical therapy without satisfactory response [Patient had decreased mobility in the joint] : patient had decreased mobility in the joint [Risks, benefits, alternatives discussed / Verbal consent obtained] : the risks benefits, and alternatives have been discussed, and verbal consent was obtained [Precise injection in area of tear] : precise injection in area of tear [All ultrasound images have been permanently captured and stored accordingly in our picture archiving and communication system] : All ultrasound images have been permanently captured and stored accordingly in our picture archiving and communication system [Visualization of the needle and placement of injection was performed without complication] : visualization of the needle and placement of injection was performed without complication

## 2023-12-21 NOTE — PHYSICAL EXAM
[Bilateral] : knee bilaterally [NL (0)] : extension 0 degrees [5___] : hamstring 5[unfilled]/5 [Negative] : negative Colby's [] : no pain with varus stress [TWNoteComboBox7] : flexion 110 degrees

## 2023-12-21 NOTE — DISCUSSION/SUMMARY
[de-identified] : General Dx Discussion The patient was advised of the diagnosis. The natural history of the pathology was explained in full to the patient in layman's terms. All questions were answered. The risks and benefits of surgical and non-surgical treatment alternatives were explained in full to the patient.  Case Discussed. Euflexxa tolerated well. She will go for a course of PT. f/u 6 weeks.    Entered by Kathleen NGUYEN acting as scribe for Dr. Logan Price MD

## 2023-12-21 NOTE — HISTORY OF PRESENT ILLNESS
[3] : 3 [Euflexxa] : Euflexxa [de-identified] : ISHMAEL VIEYRA is a 83 year old F here for injection #3 of Euflexxa for GAMALIEL knees.  [] : no [de-identified] : 12/14/23 [de-identified] : GAMAILEL knees

## 2023-12-29 ENCOUNTER — RX RENEWAL (OUTPATIENT)
Age: 83
End: 2023-12-29

## 2024-02-01 ENCOUNTER — APPOINTMENT (OUTPATIENT)
Dept: ORTHOPEDIC SURGERY | Facility: CLINIC | Age: 84
End: 2024-02-01
Payer: MEDICARE

## 2024-02-01 VITALS — BODY MASS INDEX: 27.08 KG/M2 | HEIGHT: 58 IN | WEIGHT: 129 LBS

## 2024-02-01 DIAGNOSIS — M17.11 UNILATERAL PRIMARY OSTEOARTHRITIS, RIGHT KNEE: ICD-10-CM

## 2024-02-01 DIAGNOSIS — M17.12 UNILATERAL PRIMARY OSTEOARTHRITIS, LEFT KNEE: ICD-10-CM

## 2024-02-01 PROCEDURE — 99213 OFFICE O/P EST LOW 20 MIN: CPT

## 2024-02-01 NOTE — DISCUSSION/SUMMARY
[de-identified] : General Dx Discussion The patient was advised of the diagnosis. The natural history of the pathology was explained in full to the patient in layman's terms. All questions were answered. The risks and benefits of surgical and non-surgical treatment alternatives were explained in full to the patient.  f/u 5-6 months

## 2024-02-01 NOTE — HISTORY OF PRESENT ILLNESS
[5] : 5 [0] : 0 [Sharp] : sharp [Intermittent] : intermittent [Household chores] : household chores [Leisure] : leisure [de-identified] : 02/01/2024: ISHMAEL VIEYRA is a 83 year old F here for a follow up for GAMALIEL knees. Pt reports that she is doing about the same since the last visit. She states that the previous injections only offered slight relief of her pain.  [] : no [FreeTextEntry1] : GAMALIEL knees

## 2024-03-04 ENCOUNTER — APPOINTMENT (OUTPATIENT)
Dept: INTERNAL MEDICINE | Facility: CLINIC | Age: 84
End: 2024-03-04
Payer: MEDICARE

## 2024-03-04 VITALS
WEIGHT: 142 LBS | BODY MASS INDEX: 29.81 KG/M2 | SYSTOLIC BLOOD PRESSURE: 143 MMHG | DIASTOLIC BLOOD PRESSURE: 81 MMHG | OXYGEN SATURATION: 96 % | HEIGHT: 58 IN | HEART RATE: 72 BPM

## 2024-03-04 DIAGNOSIS — R21 RASH AND OTHER NONSPECIFIC SKIN ERUPTION: ICD-10-CM

## 2024-03-04 DIAGNOSIS — J30.89 OTHER ALLERGIC RHINITIS: ICD-10-CM

## 2024-03-04 PROCEDURE — 99213 OFFICE O/P EST LOW 20 MIN: CPT

## 2024-03-04 RX ORDER — LEVOCETIRIZINE DIHYDROCHLORIDE 5 MG/1
5 TABLET ORAL
Qty: 30 | Refills: 1 | Status: ACTIVE | COMMUNITY
Start: 2024-03-04 | End: 1900-01-01

## 2024-03-04 RX ORDER — MUPIROCIN 20 MG/G
2 OINTMENT TOPICAL TWICE DAILY
Qty: 1 | Refills: 2 | Status: ACTIVE | COMMUNITY
Start: 2024-03-04 | End: 1900-01-01

## 2024-03-04 RX ORDER — CLOTRIMAZOLE AND BETAMETHASONE DIPROPIONATE 10; .5 MG/G; MG/G
1-0.05 CREAM TOPICAL TWICE DAILY
Qty: 1 | Refills: 1 | Status: ACTIVE | COMMUNITY
Start: 2024-03-04 | End: 1900-01-01

## 2024-03-04 NOTE — REVIEW OF SYSTEMS
[TextEntry] : Head: C/o  headache,  no dizziness Eyes: No acute  vision problem  Ears: Denies hearing loss, tinnitus, no ear pain Nose: C/o nasal congestion, runny nose ,  no sinus pressure Neck: Denies stiffness or muscle tenderness Chest: No  C/o cough ,  no SOB CV: Denies chest pain, palpitation Abdominal: Denies abdominal pain, change in bowel movement Neurology: Denies  changes in mental status, seizure, no neurological deficit Skin- redness of the skin in nostrills and under the nose

## 2024-03-04 NOTE — HISTORY OF PRESENT ILLNESS
[FreeTextEntry8] : Patient is 83 year female with PMH of Diet controlled DM type 2 , Hyperlipidemia, came today with c/o skin irritation of both nostrils due to constant nasal leak due to Allergic rhinitis

## 2024-03-04 NOTE — PHYSICAL EXAM
[de-identified] : irritation and redness of the skin of both nosrills and above the upper lips [TextEntry] : Constitutional:  AAO X 3 , afebrile Ears: TM's normal bilaterally.  Nose: nasal mucous edematous, clear rhinorrhea, moderate airway obstruction.  Sinus tenderness to percussion.  Pharynx mildly erythematous, no exudates.  Neck: Supple, no lymphadenopathy..  Lungs: mildly rhonchial, no wheezes, no rales.  CV: NSR, no murmurs, rubs, gallops.  Abdomen:: soft, nontender.

## 2024-03-18 ENCOUNTER — APPOINTMENT (OUTPATIENT)
Dept: ORTHOPEDIC SURGERY | Facility: CLINIC | Age: 84
End: 2024-03-18
Payer: MEDICARE

## 2024-03-18 DIAGNOSIS — Z98.890 OTHER SPECIFIED POSTPROCEDURAL STATES: ICD-10-CM

## 2024-03-18 PROCEDURE — 99213 OFFICE O/P EST LOW 20 MIN: CPT

## 2024-03-18 PROCEDURE — 72100 X-RAY EXAM L-S SPINE 2/3 VWS: CPT

## 2024-03-18 NOTE — ASSESSMENT
[FreeTextEntry1] : 11 months s/p L3-4 laminectomy and fusion. LS xrays today: construct stable. Attending PT. Remains with residual back stiffness and occasional right sharp pain.   - Ordered CT scan to eval hardware and healing - F/up after CT scan.

## 2024-03-18 NOTE — IMAGING
[Implants in position] : Implants in position [No loosening of hardware] : No loosening of hardware [Fusion intact] : Fusion intact [de-identified] : LSPINE Inspection: no defects, deformity Palpation: No tenderness or spasm in bilateral and lumbar paraspinal musculature ROM: Full with stiffness.  Motor: no focal deficit  Strength: 5/5 bilateral hip flexors, knee extensors, ankle dorsiflexors, EHL, ankle plantarflexors Sensation I LT  - SLR B/L  Gait non antalgic,

## 2024-03-18 NOTE — HISTORY OF PRESENT ILLNESS
[de-identified] : DOS: 4/14/23 S/p L3-4 laminotomy, L5 hemilaminotomy w/ posterior fusion.   3/18/24: Follow Up- L Spine. Feels stiffness across low back; occasionally feels sharp pain on right side into hip.   12/19/23 Presents today 6 months s/p  S/p L3-4 laminotomy, L5 hemilaminotomy w/ posterior fusion. Patient reports she continues to have low back pain but denies radicular symptoms. Completed PT, no longer taking medications for pain. Worsens with activity.   09/19/2023 Here for a f/u of lower back. 4 months s/p laminectomy. Reports radicular complaints preoperative have resolved. States back remains sore and stiff. Difficult changing positions. Taking pain meds PRN. Currently in PT.   08/08/2023 Here for a f/u of lower back. Residual RLE N/T in calf. Overall doing well.   7/11/23 Patient presents today in follow up s/p Lumbar l3-L4 Laminotomy, L5 hemilaminectomy with posterior fusion and autograft L3-4-5 on 4/14/23. Patient reports persistent LLE medial numbness, new after surgery with no improvement and persistent low back pain. Patient has completed 3 weeks of PT now transitioned to HEP. Patient continues to take Norco prn. Patient continues to utilize bone stimulator and LSO brace.

## 2024-03-26 ENCOUNTER — RX RENEWAL (OUTPATIENT)
Age: 84
End: 2024-03-26

## 2024-04-09 ENCOUNTER — APPOINTMENT (OUTPATIENT)
Dept: ORTHOPEDIC SURGERY | Facility: CLINIC | Age: 84
End: 2024-04-09
Payer: MEDICARE

## 2024-04-09 DIAGNOSIS — Z98.1 ARTHRODESIS STATUS: ICD-10-CM

## 2024-04-09 PROCEDURE — 99213 OFFICE O/P EST LOW 20 MIN: CPT

## 2024-04-09 NOTE — ASSESSMENT
[FreeTextEntry1] : ~1 year s/p L3-4 laminectomy and fusion. CT scan shows no hardware failure and/or residual stenosis. There is mild foraminal stenosis at level above. Gena symptoms are localized right on incision site.   - Discussed pain mgmt referral, deferred at the moment.  - Prescribed pain meds PRN.  - F/up as symptoms dictate.

## 2024-04-09 NOTE — HISTORY OF PRESENT ILLNESS
[FreeTextEntry5] : CT REVIEW - L Spine [de-identified] : DOS: 4/14/23 S/p L3-4 laminotomy, L5 hemilaminotomy w/ posterior fusion.   4/9/24: Follow up L Spine. No changes since last visit.   3/18/24: Follow Up- L Spine. Feels stiffness across low back; occasionally feels sharp pain on right side into hip.   12/19/23 Presents today 6 months s/p  S/p L3-4 laminotomy, L5 hemilaminotomy w/ posterior fusion. Patient reports she continues to have low back pain but denies radicular symptoms. Completed PT, no longer taking medications for pain. Worsens with activity.   09/19/2023 Here for a f/u of lower back. 4 months s/p laminectomy. Reports radicular complaints preoperative have resolved. States back remains sore and stiff. Difficult changing positions. Taking pain meds PRN. Currently in PT.   08/08/2023 Here for a f/u of lower back. Residual RLE N/T in calf. Overall doing well.   7/11/23 Patient presents today in follow up s/p Lumbar l3-L4 Laminotomy, L5 hemilaminectomy with posterior fusion and autograft L3-4-5 on 4/14/23. Patient reports persistent LLE medial numbness, new after surgery with no improvement and persistent low back pain. Patient has completed 3 weeks of PT now transitioned to HEP. Patient continues to take Norco prn. Patient continues to utilize bone stimulator and LSO brace.

## 2024-04-09 NOTE — IMAGING
[Implants in position] : Implants in position [No loosening of hardware] : No loosening of hardware [Fusion intact] : Fusion intact [de-identified] : LSPINE Inspection: no defects, deformity Palpation: No tenderness or spasm in bilateral and lumbar paraspinal musculature ROM: Full with stiffness.  Motor: no focal deficit  Strength: 5/5 bilateral hip flexors, knee extensors, ankle dorsiflexors, EHL, ankle plantarflexors Sensation I LT  - SLR B/L  Gait non antalgic,

## 2024-04-09 NOTE — DATA REVIEWED
[CT Scan] : CT scan [Lumbar Spine] : lumbar spine [Report was reviewed and noted in the chart] : The report was reviewed and noted in the chart [I independently reviewed and interpreted images and report] : I independently reviewed and interpreted images and report [FreeTextEntry1] : @ CT scan 4/1/24 1. S/p L3-L5 and laminectomy and posterior lumbar interbody fusion. The hardware construct is intact. Good decompression of the spinal canal. No evidence of severe foraminal stenosis. 2.  L2-3 circumferential disc bulge and facet hypertrophy results in moderate left, mild right neural foraminal stenosis.

## 2024-06-11 NOTE — HISTORY OF PRESENT ILLNESS
[Lower back] : lower back [Radiating] : radiating [Sharp] : sharp [Shooting] : shooting [Throbbing] : throbbing [Constant] : constant [Sleep] : sleep [Nothing helps with pain getting better] : Nothing helps with pain getting better [8] : 8 [0] : 0 [de-identified] : 08/08/2023 Here for a f/u of lower back. Residal RLE N/T in calf. Overall doing well.   7/11/23 Patient presents today in follow up s/p Lumbar l3-L4 Laminotomy, L5 hemilaminectomy with posterior fusion and autograft L3-4-5 on 4/14/23.  Patient reports persistent LLE medial numbness, new after surgery with no improvement and persistent low back pain. Patient has completed 3 weeks of PT now transitioned to HEP. Patient continues to take Norco prn. Patient continues to utilize bone stimulator and LSO brace.  Xrays completed today of lumbar spine construct in good place [] : no [FreeTextEntry6] : numbness [FreeTextEntry5] : Pt here for PO. Pain in low left leg has gotten better. Feels pain across low back with walking/activity. Sitting/resting alleviates pain.  [FreeTextEntry7] : left leg [de-identified] : MRI L Spine  [phone]

## 2024-09-24 ENCOUNTER — NON-APPOINTMENT (OUTPATIENT)
Age: 84
End: 2024-09-24

## 2024-09-25 ENCOUNTER — APPOINTMENT (OUTPATIENT)
Dept: DERMATOLOGY | Facility: CLINIC | Age: 84
End: 2024-09-25
Payer: MEDICARE

## 2024-09-25 VITALS — HEIGHT: 62 IN | WEIGHT: 128 LBS | BODY MASS INDEX: 23.55 KG/M2

## 2024-09-25 VITALS — WEIGHT: 138 LBS | BODY MASS INDEX: 28.97 KG/M2 | HEIGHT: 58 IN

## 2024-09-25 PROCEDURE — 99204 OFFICE O/P NEW MOD 45 MIN: CPT | Mod: 25

## 2024-09-25 PROCEDURE — 95044 PATCH/APPLICATION TESTS: CPT

## 2024-09-27 ENCOUNTER — APPOINTMENT (OUTPATIENT)
Dept: DERMATOLOGY | Facility: CLINIC | Age: 84
End: 2024-09-27
Payer: MEDICARE

## 2024-09-27 PROCEDURE — 99212 OFFICE O/P EST SF 10 MIN: CPT

## 2024-09-27 NOTE — HISTORY OF PRESENT ILLNESS
[FreeTextEntry1] : np [de-identified] : 85 yo F with ongoing perioral rash this past year for at least 6 months.   Rash initially involved nose, cheeks, upper cutaneous lip-- assoc with sig swelling of nose and crusting of nares. Has been following with DR. Bruce downey/ [facility] . Was prescribed a bevy of creams including topical steroids (HCT 2.5%, HCT valerate, fluocinolone), mupirocin, keto cream, tacrolimus, metrocream---- none of which she said worked. She has been biopsied twice (2mm punch x 2, reports uploaded into chart). Both biopsies showed spongiosis, although further review of 8/2024 bx favored rosacea. She has taken multiple courses of antibiotics including sarecycline (dose? 100mg daily-- 1 wk only), as well as more recent courses of bactrim w/ENT. Outside patch testing (TRUE test) showed positive reactions to MI/MCI and formaldehyde.   Rash is burning, at times itchy. Of note, she has a rash on the 2 fingers that she used to apply all her topical ointments, never had a rahs on these 2 fingers prior.   The patient is adamant about not putting anything on the face recently except for vaseline. She denies wearing any CPAP machine, nebulizer, facemask. She also denies any inhaler, nasal spray, makeup. Does not cleanse face with anything other than water. Does have longtime eczema dating back to childhood which had been stable up util recent rash. She also reports hx of allergies and is frequently carrying around a kleenex to blow her nose.   [employer] SERIES  Patch #1: Medicaments Benzocaine Amerchol L-101 Neomycin Clobetasol-17-propionate Bacitracin Tixocortol-21-pivalate Budesonide Polymyxin B sulfate Lidocaine Propylene glycol   Patch #2: Cosmetics p-phenylenediamine (PPD) 2- hydroxyl- 4 methoxybenzophenone Cocamide PAULA Ethyl hexyl glycerol Dimethylaminopropylamine      2- ethylhexyl- 4 methoxycinnamate      Sorbitan sesquioleate Benzophenon Lauryl Glycoside Oleamidopropyl dimethylamine  Patch #3: Cosmetics / Medicaments / Preservative Tocopherol Benzyl salicylate Chlorhexidine digluconate Benzyl alcohol Amidoamine Cocoamidopropyl betaine Decyl glucoside Cetylstearyl alcohol 4 -Chloro-3 cresol Methylisothiazolinone, Methylchloroisothiazolinone (MI, MCI)  Patch #4: Preservatives Quaternium-15 Imidazolidinyl urea Diazolidinyl urea Paraben mix Methyldibromo glutaronitrile 2-tert-butyl-4-methoxyphenol (BHA) Iodopropynyl butylcarbamate Formaldehyde Methylisothiazolinone (MI) DMDM hydantoin  Patch #5: Fragrance Cinnamal Patrick Balsum Fragrance mix I Fragrance mix II Tea tree oil Lavender Oil Hydroxyisohexyl 3-cyclohexene carboxaldehyde Sadie oil Peppermint oil Cananga odorata (Ylang ylang oil)  Patch #6: Dyes / Rubber Disperse orange 3 Black Rubber mix Disperse yellow 3 Disperse blue mix 2-Mercaptobenzothiazole (MBT) Carba mix Thiuram mix Mercapto mix 1,3-diphenylguanidine Mixed dialkyl thiourea  Patch #7: Resins / Acrylates / Plants Colophonium Epoxy resin, Bisphenol A 1-zhbi-kljlvmezticlcmfofcnagyf resin (PTBP) Propolis Shellac Toluenesulfonamide formaldehyde resin Ethyl acrylate Methyl methacrylate 2-hydroxyethyl methacrylate (HEMA) Sesquiterpene lactone mix  Patch #8: Industrial / Metals / Plants/other Ethylenediamine dihydrochloride [place] 2-bromo-4-nitropropane-1,3-diol (Bronopol) Triamcinolone Chloroxylenol (PCMX) Potassium dichromate Nickel (II) sulfate hexahydrate Gold (I) sodium thiosulfate dihydrate Cobalt (II) chloride hexahydrate Compositae mix II

## 2024-09-27 NOTE — ASSESSMENT
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 This raises thr possibilkity of a preservative found in ointments such as propylene glycol. I also wonder if formaldehyde or resins in her facial tissue paper could be the culprit. Patch testing as below, advised pt to use vaseline only  #contact derm, possible American Core Series (80 patches) placed today Anticipatory guidance provided. Pt understands not to get back wet and to avoid scratching, sweating, and topicals on the back until the final read. OK to take antihistamines.

## 2024-09-27 NOTE — PHYSICAL EXAM
[FreeTextEntry3] : well demeracted erythema on dorsal node, medial cheeks and upper cutaneous lip 2 left fingers with scaling erythema and

## 2024-09-27 NOTE — HISTORY OF PRESENT ILLNESS
[FreeTextEntry1] : np [de-identified] : 85 yo F with ongoing perioral rash this past year for at least 6 months.   Rash initially involved nose, cheeks, upper cutaneous lip-- assoc with sig swelling of nose and crusting of nares. Has been following with DR. Bruce downey/ [facility] . Was prescribed a bevy of creams including topical steroids (HCT 2.5%, HCT valerate, fluocinolone), mupirocin, keto cream, tacrolimus, metrocream---- none of which she said worked. She has been biopsied twice (2mm punch x 2, reports uploaded into chart). Both biopsies showed spongiosis, although further review of 8/2024 bx favored rosacea. She has taken multiple courses of antibiotics including sarecycline (dose? 100mg daily-- 1 wk only), as well as more recent courses of bactrim w/ENT. Outside patch testing (TRUE test) showed positive reactions to MI/MCI and formaldehyde.   Rash is burning, at times itchy. Of note, she has a rash on the 2 fingers that she used to apply all her topical ointments, never had a rahs on these 2 fingers prior.   The patient is adamant about not putting anything on the face recently except for vaseline. She denies wearing any CPAP machine, nebulizer, facemask. She also denies any inhaler, nasal spray, makeup. Does not cleanse face with anything other than water. Does have longtime eczema dating back to childhood which had been stable up util recent rash. She also reports hx of allergies and is frequently carrying around a kleenex to blow her nose.   AMERICAN CORE SERIES  Patch #1: Medicaments Benzocaine Amerchol L-101 Neomycin Clobetasol-17-propionate Bacitracin Tixocortol-21-pivalate Budesonide Polymyxin B sulfate Lidocaine Propylene glycol   Patch #2: Cosmetics p-phenylenediamine (PPD) 2- hydroxyl- 4 methoxybenzophenone Cocamide PAULA Ethyl hexyl glycerol Dimethylaminopropylamine      2- ethylhexyl- 4 methoxycinnamate      Sorbitan sesquioleate Benzophenon Lauryl Glycoside Oleamidopropyl dimethylamine  Patch #3: Cosmetics / Medicaments / Preservative Tocopherol Benzyl salicylate Chlorhexidine digluconate Benzyl alcohol Amidoamine Cocoamidopropyl betaine Decyl glucoside Cetylstearyl alcohol 4 -Chloro-3 cresol Methylisothiazolinone, Methylchloroisothiazolinone (MI, MCI)  Patch #4: Preservatives Quaternium-15 Imidazolidinyl urea Diazolidinyl urea Paraben mix Methyldibromo glutaronitrile 2-tert-butyl-4-methoxyphenol (BHA) Iodopropynyl butylcarbamate Formaldehyde Methylisothiazolinone (MI) DMDM hydantoin  Patch #5: Fragrance Cinnamal Patrick Balsum Fragrance mix I Fragrance mix II Tea tree oil Lavender Oil Hydroxyisohexyl 3-cyclohexene carboxaldehyde Sadie oil Peppermint oil Cananga odorata (Ylang ylang oil)  Patch #6: Dyes / Rubber Disperse orange 3 Black Rubber mix Disperse yellow 3 Disperse blue mix 2-Mercaptobenzothiazole (MBT) Carba mix Thiuram mix Mercapto mix 1,3-diphenylguanidine Mixed dialkyl thiourea  Patch #7: Resins / Acrylates / Plants Colophonium Epoxy resin, Bisphenol A 9-uqch-ausssohcmwjeuurujnumqke resin (PTBP) Propolis Shellac Toluenesulfonamide formaldehyde resin Ethyl acrylate Methyl methacrylate 2-hydroxyethyl methacrylate (HEMA) Sesquiterpene lactone mix  Patch #8: Industrial / Metals / Plants/other Ethylenediamine dihydrochloride [place] 2-bromo-4-nitropropane-1,3-diol (Bronopol) Triamcinolone Chloroxylenol (PCMX) Potassium dichromate Nickel (II) sulfate hexahydrate Gold (I) sodium thiosulfate dihydrate Cobalt (II) chloride hexahydrate Compositae mix II

## 2024-09-28 NOTE — HISTORY OF PRESENT ILLNESS
[FreeTextEntry1] : np [de-identified] : 85 yo F with ongoing perioral rash this past year for at least 6 months.   Rash initially involved nose, cheeks, upper cutaneous lip-- assoc with sig swelling of nose and crusting of nares. Has been following with DR. Bruce downey/ [facility] . Was prescribed a bevy of creams including topical steroids (HCT 2.5%, HCT valerate, fluocinolone), mupirocin, keto cream, tacrolimus, metrocream---- none of which she said worked. She has been biopsied twice (2mm punch x 2, reports uploaded into chart). Both biopsies showed spongiosis, although further review of 8/2024 bx favored rosacea. She has taken multiple courses of antibiotics including sarecycline (dose? 100mg daily-- 1 wk only), as well as more recent courses of bactrim w/ENT. Outside patch testing (TRUE test) showed positive reactions to MI/MCI and formaldehyde.   Rash is burning, at times itchy. Of note, she has a rash on the 2 fingers that she used to apply all her topical ointments, never had a rahs on these 2 fingers prior.   The patient is adamant about not putting anything on the face recently except for vaseline. She denies wearing any CPAP machine, nebulizer, facemask. She also denies any inhaler, nasal spray, makeup. Does not cleanse face with anything other than water. Does have longtime eczema dating back to childhood which had been stable up util recent rash. She also reports hx of allergies and is frequently carrying around a kleenex to blow her nose.   AMERICAN CORE SERIES  Patch #1: Medicaments Benzocaine Amerchol L-101 Neomycin Clobetasol-17-propionate Bacitracin Tixocortol-21-pivalate Budesonide Polymyxin B sulfate Lidocaine Propylene glycol   Patch #2: Cosmetics p-phenylenediamine (PPD) 2- hydroxyl- 4 methoxybenzophenone Cocamide PAULA Ethyl hexyl glycerol Dimethylaminopropylamine      2- ethylhexyl- 4 methoxycinnamate      Sorbitan sesquioleate Benzophenon Lauryl Glycoside Oleamidopropyl dimethylamine  Patch #3: Cosmetics / Medicaments / Preservative Tocopherol Benzyl salicylate Chlorhexidine digluconate Benzyl alcohol Amidoamine Cocoamidopropyl betaine Decyl glucoside Cetylstearyl alcohol 4 -Chloro-3 cresol Methylisothiazolinone, Methylchloroisothiazolinone (MI, MCI)  Patch #4: Preservatives Quaternium-15 Imidazolidinyl urea Diazolidinyl urea Paraben mix Methyldibromo glutaronitrile 2-tert-butyl-4-methoxyphenol (BHA) Iodopropynyl butylcarbamate Formaldehyde Methylisothiazolinone (MI) DMDM hydantoin  Patch #5: Fragrance Cinnamal Patrick Balsum Fragrance mix I Fragrance mix II Tea tree oil Lavender Oil Hydroxyisohexyl 3-cyclohexene carboxaldehyde Sadie oil Peppermint oil Cananga odorata (Ylang ylang oil)  Patch #6: Dyes / Rubber Disperse orange 3 Black Rubber mix Disperse yellow 3 Disperse blue mix 2-Mercaptobenzothiazole (MBT) Carba mix Thiuram mix Mercapto mix 1,3-diphenylguanidine Mixed dialkyl thiourea  Patch #7: Resins / Acrylates / Plants Colophonium Epoxy resin, Bisphenol A 5-tygd-pnidupfijhddtcgpypaadsy resin (PTBP) Propolis Shellac Toluenesulfonamide formaldehyde resin Ethyl acrylate Methyl methacrylate 2-hydroxyethyl methacrylate (HEMA) Sesquiterpene lactone mix  Patch #8: Industrial / Metals / Plants/other Ethylenediamine dihydrochloride [place] 2-bromo-4-nitropropane-1,3-diol (Bronopol) Triamcinolone Chloroxylenol (PCMX) Potassium dichromate Nickel (II) sulfate hexahydrate Gold (I) sodium thiosulfate dihydrate Cobalt (II) chloride hexahydrate Compositae mix II

## 2024-09-28 NOTE — ASSESSMENT
[FreeTextEntry1] : NOT ADDRESSED # rash, perioral, well demaracted and erythematous with #eczematous derm on 2 fingers (used to apply ointment to face) ddx ACD vs perioral dermatitis vs rosacea --- Failed multiple topicals including HCT 2.5%, HCT valerate, fluocinolone), mupirocin, keto cream, tacrolimus, metrocream ---- s/p ?1 week course sarecycline and bactrim ---- 2mm Punch bx 8/2024 showed spongiosis---> further review favored rosacea w/dense lichenoid and perifollicular infiltrate (outside record scanned) ---- 2mm punch bx 9/2024 showed spong derm, ulcerated --- Outside patch test (TRUE test) w/ positive reactions to MI/MCI and Formaldehyde - 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 This raises thr possibilkity of a preservative found in ointments such as propylene glycol. I also wonder if formaldehyde or resins in her facial tissue paper could be the culprit. Patch testing as below, advised pt to use vaseline only  #contact derm, possible American Core Series (80 patches) removed today  [place] positive/iritant: MI/MCI, MI, ?mercapto mix, ?propolis Anticipatory guidance provided. Pt understands not to get back wet and to avoid scratching, sweating, and topicals on the back until the final read. OK to take antihistamines. - Clinical photos uploaded into chart, reviewed by me remotely

## 2024-09-30 ENCOUNTER — APPOINTMENT (OUTPATIENT)
Dept: DERMATOLOGY | Facility: CLINIC | Age: 84
End: 2024-09-30
Payer: MEDICARE

## 2024-09-30 VITALS — HEIGHT: 58 IN | WEIGHT: 138 LBS | BODY MASS INDEX: 28.97 KG/M2

## 2024-09-30 PROCEDURE — G2211 COMPLEX E/M VISIT ADD ON: CPT

## 2024-09-30 PROCEDURE — 99215 OFFICE O/P EST HI 40 MIN: CPT

## 2024-10-02 ENCOUNTER — NON-APPOINTMENT (OUTPATIENT)
Age: 84
End: 2024-10-02

## 2024-10-04 ENCOUNTER — APPOINTMENT (OUTPATIENT)
Dept: DERMATOLOGY | Facility: CLINIC | Age: 84
End: 2024-10-04
Payer: MEDICARE

## 2024-10-04 VITALS — HEIGHT: 58 IN | BODY MASS INDEX: 28.97 KG/M2 | WEIGHT: 138 LBS

## 2024-10-04 DIAGNOSIS — L23.9 ALLERGIC CONTACT DERMATITIS, UNSPECIFIED CAUSE: ICD-10-CM

## 2024-10-04 PROCEDURE — 99214 OFFICE O/P EST MOD 30 MIN: CPT

## 2024-10-04 RX ORDER — PREDNISONE 10 MG/1
10 TABLET ORAL
Qty: 30 | Refills: 0 | Status: ACTIVE | COMMUNITY
Start: 2024-10-04 | End: 1900-01-01

## 2024-10-04 NOTE — PHYSICAL EXAM
[FreeTextEntry3] : well demeracted erythema on dorsal node, medial cheeks and upper cutaneous lip with noted progression of edema, induration, and overlying scale of the central face 2 left fingers with scaling erythema

## 2024-10-04 NOTE — HISTORY OF PRESENT ILLNESS
[FreeTextEntry1] : np [de-identified] : 83 yo F with ongoing perioral rash this past year for at least 6 months.  LV 10/1/24 with Dr. Cavanaugh, completed patch testing with  ++ MCI/MI ++ Lavender oil ++ Mercapto mix ++ 0-lfqq-hykbfgjzbhgpnvhcpwhodju (PTBP) ++ 2-bromo-4-nitropropane-1,3 diol (bronopol) ++ Compositae mix II  Presenting today 10/4 as acute care visit with concern for worsening flare since earlier this week after patches removed. Now endorsing very red, inflamed pruritic rash of the central face area, that is also more swollen and tender than usual. She endorses that she has just being using plain vaseline, no topical steroids.  HX: Rash initially involved nose, cheeks, upper cutaneous lip-- assoc with sig swelling of nose and crusting of nares. Has been following with DR. Bruce downey/ [facility] . Was prescribed a bevy of creams including topical steroids (HCT 2.5%, HCT valerate, fluocinolone), mupirocin, keto cream, tacrolimus, metrocream---- none of which she said worked. She has been biopsied twice (2mm punch x 2, reports uploaded into chart). Both biopsies showed spongiosis, although further review of 8/2024 bx favored rosacea. She has taken multiple courses of antibiotics including sarecycline (dose? 100mg daily-- 1 wk only), as well as more recent courses of bactrim w/ENT. Outside patch testing (TRUE test) showed positive reactions to MI/MCI and formaldehyde.   Rash is burning, at times itchy. Of note, she has a rash on the 2 fingers that she used to apply all her topical ointments, never had a rahs on these 2 fingers prior.   The patient is adamant about not putting anything on the face recently except for vaseline. She denies wearing any CPAP machine, nebulizer, facemask. She also denies any inhaler, nasal spray, makeup. Does not cleanse face with anything other than water. Does have longtime eczema dating back to childhood which had been stable up util recent rash. She also reports hx of allergies and is frequently carrying around a kleenex to blow her nose.   AMERICAN CORE SERIES  Patch #1: Medicaments Benzocaine Amerchol L-101 Neomycin Clobetasol-17-propionate Bacitracin Tixocortol-21-pivalate Budesonide Polymyxin B sulfate Lidocaine Propylene glycol   Patch #2: Cosmetics p-phenylenediamine (PPD) 2- hydroxyl- 4 methoxybenzophenone Cocamide PAULA Ethyl hexyl glycerol Dimethylaminopropylamine      2- ethylhexyl- 4 methoxycinnamate      Sorbitan sesquioleate Benzophenon Lauryl Glycoside Oleamidopropyl dimethylamine  Patch #3: Cosmetics / Medicaments / Preservative Tocopherol Benzyl salicylate Chlorhexidine digluconate Benzyl alcohol Amidoamine Cocoamidopropyl betaine Decyl glucoside Cetylstearyl alcohol 4 -Chloro-3 cresol Methylisothiazolinone, Methylchloroisothiazolinone (MI, MCI)  Patch #4: Preservatives Quaternium-15 Imidazolidinyl urea Diazolidinyl urea Paraben mix Methyldibromo glutaronitrile 2-tert-butyl-4-methoxyphenol (BHA) Iodopropynyl butylcarbamate Formaldehyde Methylisothiazolinone (MI) DMDM hydantoin  Patch #5: Fragrance Cinnamal [place] Balsum Fragrance mix I Fragrance mix II Tea tree oil Lavender Oil Hydroxyisohexyl 3-cyclohexene carboxaldehyde Sadie oil Peppermint oil Cananga odorata (Ylang ylang oil)  Patch #6: Dyes / Rubber Disperse orange 3 Black Rubber mix Disperse yellow 3 Disperse blue mix 2-Mercaptobenzothiazole (MBT) Carba mix Thiuram mix Mercapto mix 1,3-diphenylguanidine Mixed dialkyl thiourea  Patch #7: Resins / Acrylates / Plants Colophonium Epoxy resin, Bisphenol A 6-giyo-iuhspemgeylbdiqebesrpaq resin (PTBP) Propolis Shellac Toluenesulfonamide formaldehyde resin Ethyl acrylate Methyl methacrylate 2-hydroxyethyl methacrylate (HEMA) Sesquiterpene lactone mix  Patch #8: Industrial / Metals / Plants/other Ethylenediamine dihydrochloride [place] 2-bromo-4-nitropropane-1,3-diol (Bronopol) Triamcinolone Chloroxylenol (PCMX) Potassium dichromate Nickel (II) sulfate hexahydrate Gold (I) sodium thiosulfate dihydrate Cobalt (II) chloride hexahydrate Compositae mix II

## 2024-10-04 NOTE — HISTORY OF PRESENT ILLNESS
[FreeTextEntry1] : np [de-identified] : 85 yo F with ongoing perioral rash this past year for at least 6 months.  LV 10/1/24 with Dr. Cavanaugh, completed patch testing with  ++ MCI/MI ++ Lavender oil ++ Mercapto mix ++ 6-qyux-pfwoyzzrtjjrbywonvfyebw (PTBP) ++ 2-bromo-4-nitropropane-1,3 diol (bronopol) ++ Compositae mix II  Presenting today 10/4 as acute care visit with concern for worsening flare since earlier this week after patches removed. Now endorsing very red, inflamed pruritic rash of the central face area, that is also more swollen and tender than usual. She endorses that she has just being using plain vaseline, no topical steroids.  HX: Rash initially involved nose, cheeks, upper cutaneous lip-- assoc with sig swelling of nose and crusting of nares. Has been following with DR. Bruce downey/ [facility] . Was prescribed a bevy of creams including topical steroids (HCT 2.5%, HCT valerate, fluocinolone), mupirocin, keto cream, tacrolimus, metrocream---- none of which she said worked. She has been biopsied twice (2mm punch x 2, reports uploaded into chart). Both biopsies showed spongiosis, although further review of 8/2024 bx favored rosacea. She has taken multiple courses of antibiotics including sarecycline (dose? 100mg daily-- 1 wk only), as well as more recent courses of bactrim w/ENT. Outside patch testing (TRUE test) showed positive reactions to MI/MCI and formaldehyde.   Rash is burning, at times itchy. Of note, she has a rash on the 2 fingers that she used to apply all her topical ointments, never had a rahs on these 2 fingers prior.   The patient is adamant about not putting anything on the face recently except for vaseline. She denies wearing any CPAP machine, nebulizer, facemask. She also denies any inhaler, nasal spray, makeup. Does not cleanse face with anything other than water. Does have longtime eczema dating back to childhood which had been stable up util recent rash. She also reports hx of allergies and is frequently carrying around a kleenex to blow her nose.   AMERICAN CORE SERIES  Patch #1: Medicaments Benzocaine Amerchol L-101 Neomycin Clobetasol-17-propionate Bacitracin Tixocortol-21-pivalate Budesonide Polymyxin B sulfate Lidocaine Propylene glycol   Patch #2: Cosmetics p-phenylenediamine (PPD) 2- hydroxyl- 4 methoxybenzophenone Cocamide PAULA Ethyl hexyl glycerol Dimethylaminopropylamine      2- ethylhexyl- 4 methoxycinnamate      Sorbitan sesquioleate Benzophenon Lauryl Glycoside Oleamidopropyl dimethylamine  Patch #3: Cosmetics / Medicaments / Preservative Tocopherol Benzyl salicylate Chlorhexidine digluconate Benzyl alcohol Amidoamine Cocoamidopropyl betaine Decyl glucoside Cetylstearyl alcohol 4 -Chloro-3 cresol Methylisothiazolinone, Methylchloroisothiazolinone (MI, MCI)  Patch #4: Preservatives Quaternium-15 Imidazolidinyl urea Diazolidinyl urea Paraben mix Methyldibromo glutaronitrile 2-tert-butyl-4-methoxyphenol (BHA) Iodopropynyl butylcarbamate Formaldehyde Methylisothiazolinone (MI) DMDM hydantoin  Patch #5: Fragrance Cinnamal [place] Balsum Fragrance mix I Fragrance mix II Tea tree oil Lavender Oil Hydroxyisohexyl 3-cyclohexene carboxaldehyde Sadie oil Peppermint oil Cananga odorata (Ylang ylang oil)  Patch #6: Dyes / Rubber Disperse orange 3 Black Rubber mix Disperse yellow 3 Disperse blue mix 2-Mercaptobenzothiazole (MBT) Carba mix Thiuram mix Mercapto mix 1,3-diphenylguanidine Mixed dialkyl thiourea  Patch #7: Resins / Acrylates / Plants Colophonium Epoxy resin, Bisphenol A 9-qgfs-dgjfcwtbodtjlxcbdmzxhnl resin (PTBP) Propolis Shellac Toluenesulfonamide formaldehyde resin Ethyl acrylate Methyl methacrylate 2-hydroxyethyl methacrylate (HEMA) Sesquiterpene lactone mix  Patch #8: Industrial / Metals / Plants/other Ethylenediamine dihydrochloride [place] 2-bromo-4-nitropropane-1,3-diol (Bronopol) Triamcinolone Chloroxylenol (PCMX) Potassium dichromate Nickel (II) sulfate hexahydrate Gold (I) sodium thiosulfate dihydrate Cobalt (II) chloride hexahydrate Compositae mix II

## 2024-10-04 NOTE — ASSESSMENT
[FreeTextEntry1] : # rash, perioral, well demaracted and erythematous with #eczematous derm on 2 fingers (used to apply ointment to face), chronic, progressive, flaring ddx ACD vs perioral dermatitis vs rosacea --- Failed multiple topicals including HCT 2.5%, HCT valerate, fluocinolone), mupirocin, keto cream, tacrolimus, metrocream ---- s/p ?1 week course sarecycline and bactrim ---- 2mm Punch bx 8/2024 showed spongiosis---> further review favored rosacea w/dense lichenoid and perifollicular infiltrate (outside record scanned) ---- 2mm punch bx 9/2024 showed spong derm, ulcerated --- Outside patch test (TRUE test) w/ positive reactions to MI/MCI and Formaldehyde --- ACS in our office:  ++ MCI/MI ++ Lavender oil ++ Mercapto mix ++ 2-lmir-vqdtfhivgldznxvuyduwsho (PTBP) ++ 2-bromo-4-nitropropane-1,3 diol (bronopol) ++ Compositae mix II - Continue to favor ACD given involvement of 2 fingers and also considering worsening flare now s/p patch testing. This raises thr possibilkity of a preservative found in ointments such as propylene glycol. I also wonder if formaldehyde or resins in her facial tissue paper could be the culprit.  PLAN  -- Given current worsening flare s/p patch testing, discussed recommendation for short PO prednisone course: START Prednisone 40 mg x 3 days, 30 mg x 3 days, 20 mg x 3 days, 10 mg x 3 days -- will reassess in 2 weeks and determine best next steps for Dupixent. Also will consider [place] presentation   RTC 2 weeks Dr. Cavanaugh or Dr. Amin

## 2024-10-04 NOTE — HISTORY OF PRESENT ILLNESS
[FreeTextEntry1] : f/u [de-identified] : 85 yo F with ongoing perioral rash this past year for at least 6 months.   Rash initially involved nose, cheeks, upper cutaneous lip-- assoc with sig swelling of nose and crusting of nares. Has been following with DR. Bruce downey/ [facility] . Was prescribed a bevy of creams including topical steroids (HCT 2.5%, HCT valerate, fluocinolone), mupirocin, keto cream, tacrolimus, metrocream---- none of which she said worked. She has been biopsied twice (2mm punch x 2, reports uploaded into chart). Both biopsies showed spongiosis, although further review of 8/2024 bx favored rosacea. She has taken multiple courses of antibiotics including sarecycline (dose? 100mg daily-- 1 wk only), as well as more recent courses of bactrim w/ENT. Outside patch testing (TRUE test) showed positive reactions to MI/MCI and formaldehyde.   Rash is burning, at times itchy. Of note, she has a rash on the 2 fingers that she used to apply all her topical ointments, never had a rahs on these 2 fingers prior.   The patient is adamant about not putting anything on the face recently except for vaseline. She denies wearing any CPAP machine, nebulizer, facemask. She also denies any inhaler, nasal spray, makeup. Does not cleanse face with anything other than water. Does have longtime eczema dating back to childhood which had been stable up util recent rash. She also reports hx of allergies and is frequently carrying around a kleenex to blow her nose.   AMERICAN CORE SERIES  Patch #1: Medicaments Benzocaine Amerchol L-101 Neomycin Clobetasol-17-propionate Bacitracin Tixocortol-21-pivalate Budesonide Polymyxin B sulfate Lidocaine Propylene glycol   Patch #2: Cosmetics p-phenylenediamine (PPD) 2- hydroxyl- 4 methoxybenzophenone Cocamide PAULA Ethyl hexyl glycerol Dimethylaminopropylamine      2- ethylhexyl- 4 methoxycinnamate      Sorbitan sesquioleate Benzophenon Lauryl Glycoside Oleamidopropyl dimethylamine  Patch #3: Cosmetics / Medicaments / Preservative Tocopherol Benzyl salicylate Chlorhexidine digluconate Benzyl alcohol Amidoamine Cocoamidopropyl betaine Decyl glucoside Cetylstearyl alcohol 4 -Chloro-3 cresol Methylisothiazolinone, Methylchloroisothiazolinone (MI, MCI)  Patch #4: Preservatives Quaternium-15 Imidazolidinyl urea Diazolidinyl urea Paraben mix Methyldibromo glutaronitrile 2-tert-butyl-4-methoxyphenol (BHA) Iodopropynyl butylcarbamate Formaldehyde Methylisothiazolinone (MI) DMDM hydantoin  Patch #5: Fragrance Cinnamal Patrick Balsum Fragrance mix I Fragrance mix II Tea tree oil Lavender Oil Hydroxyisohexyl 3-cyclohexene carboxaldehyde Sadie oil Peppermint oil Cananga odorata (Ylang ylang oil)  Patch #6: Dyes / Rubber Disperse orange 3 Black Rubber mix Disperse yellow 3 Disperse blue mix 2-Mercaptobenzothiazole (MBT) Carba mix Thiuram mix Mercapto mix 1,3-diphenylguanidine Mixed dialkyl thiourea  Patch #7: Resins / Acrylates / Plants Colophonium Epoxy resin, Bisphenol A 1-jcax-ajnkpmeixnwcusrvmqmejec resin (PTBP) Propolis Shellac Toluenesulfonamide formaldehyde resin Ethyl acrylate Methyl methacrylate 2-hydroxyethyl methacrylate (HEMA) Sesquiterpene lactone mix  Patch #8: Industrial / Metals / Plants/other Ethylenediamine dihydrochloride [place] 2-bromo-4-nitropropane-1,3-diol (Bronopol) Triamcinolone Chloroxylenol (PCMX) Potassium dichromate Nickel (II) sulfate hexahydrate Gold (I) sodium thiosulfate dihydrate Cobalt (II) chloride hexahydrate Compositae mix II

## 2024-10-04 NOTE — ASSESSMENT
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 This raises thr possibilkity of a preservative found in ointments such as propylene glycol. I also wonder if formaldehyde or resins in her facial tissue paper could be the culprit. Patch testing as below, advised pt to use vaseline only - Counseled on avoidance of allergens, will likely recommend short course of pred for proof of concept and possible dupixent vs rinvoq - RTC grand rounds, will obtain slides - Pt narrative sheets sent via snail mail to patient  >45 min spent with pt and with outside dermatologist as well as preparing materials mailed directly to her house

## 2024-10-04 NOTE — ASSESSMENT
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 This raises thr possibilkity of a preservative found in ointments such as propylene glycol. I also wonder if formaldehyde or resins in her facial tissue paper could be the culprit.  PLAN  -- Given current worsening flare s/p patch testing, discussed recommendation for short PO prednisone course: START Prednisone 40 mg x 3 days, 30 mg x 3 days, 20 mg x 3 days, 10 mg x 3 days -- will reassess in 2 weeks and determine best next steps for Dupixent. Also will consider Grand Rounds presentation   RTC 2 weeks Dr. Cavanaugh or Dr. Amin

## 2024-10-10 ENCOUNTER — APPOINTMENT (OUTPATIENT)
Dept: DERMATOLOGY | Facility: CLINIC | Age: 84
End: 2024-10-10
Payer: MEDICARE

## 2024-10-10 VITALS — HEIGHT: 58 IN | BODY MASS INDEX: 28.97 KG/M2 | WEIGHT: 138 LBS

## 2024-10-10 PROCEDURE — 99214 OFFICE O/P EST MOD 30 MIN: CPT | Mod: 25

## 2024-10-10 PROCEDURE — 96401 CHEMO ANTI-NEOPL SQ/IM: CPT

## 2024-10-14 DIAGNOSIS — L20.9 ATOPIC DERMATITIS, UNSPECIFIED: ICD-10-CM

## 2024-10-14 RX ORDER — DUPILUMAB 300 MG/2ML
300 INJECTION, SOLUTION SUBCUTANEOUS
Qty: 2 | Refills: 3 | Status: ACTIVE | COMMUNITY
Start: 2024-10-14 | End: 1900-01-01

## 2024-10-14 RX ORDER — DUPILUMAB 300 MG/2ML
300 INJECTION, SOLUTION SUBCUTANEOUS
Qty: 1 | Refills: 0 | Status: ACTIVE | COMMUNITY
Start: 2024-10-14 | End: 1900-01-01

## 2024-10-16 ENCOUNTER — RESULT REVIEW (OUTPATIENT)
Age: 84
End: 2024-10-16

## 2024-10-21 DIAGNOSIS — Z79.899 OTHER LONG TERM (CURRENT) DRUG THERAPY: ICD-10-CM

## 2024-10-23 RX ORDER — CLOBETASOL PROPIONATE 0.5 MG/G
0.05 OINTMENT TOPICAL
Qty: 1 | Refills: 0 | Status: ACTIVE | COMMUNITY
Start: 2024-10-23 | End: 1900-01-01

## 2024-10-24 ENCOUNTER — LABORATORY RESULT (OUTPATIENT)
Age: 84
End: 2024-10-24

## 2024-10-25 ENCOUNTER — NON-APPOINTMENT (OUTPATIENT)
Age: 84
End: 2024-10-25

## 2024-10-28 ENCOUNTER — LABORATORY RESULT (OUTPATIENT)
Age: 84
End: 2024-10-28

## 2024-10-28 ENCOUNTER — APPOINTMENT (OUTPATIENT)
Dept: DERMATOLOGY | Facility: CLINIC | Age: 84
End: 2024-10-28
Payer: MEDICARE

## 2024-10-28 VITALS — HEIGHT: 58 IN | BODY MASS INDEX: 28.97 KG/M2 | WEIGHT: 138 LBS

## 2024-10-28 DIAGNOSIS — L23.9 ALLERGIC CONTACT DERMATITIS, UNSPECIFIED CAUSE: ICD-10-CM

## 2024-10-28 DIAGNOSIS — D48.9 NEOPLASM OF UNCERTAIN BEHAVIOR, UNSPECIFIED: ICD-10-CM

## 2024-10-28 LAB
ALBUMIN SERPL ELPH-MCNC: 4.1 G/DL
ALP BLD-CCNC: 76 U/L
ALT SERPL-CCNC: 17 U/L
ANION GAP SERPL CALC-SCNC: 12 MMOL/L
AST SERPL-CCNC: 22 U/L
BASOPHILS # BLD AUTO: 0.14 K/UL
BASOPHILS NFR BLD AUTO: 1.1 %
BILIRUB SERPL-MCNC: 0.3 MG/DL
BUN SERPL-MCNC: 17 MG/DL
CALCIUM SERPL-MCNC: 9.4 MG/DL
CHLORIDE SERPL-SCNC: 108 MMOL/L
CHOLEST SERPL-MCNC: 148 MG/DL
CO2 SERPL-SCNC: 24 MMOL/L
CREAT SERPL-MCNC: 0.97 MG/DL
EGFR: 57 ML/MIN/1.73M2
EOSINOPHIL # BLD AUTO: 0.3 K/UL
EOSINOPHIL NFR BLD AUTO: 2.4 %
GLUCOSE SERPL-MCNC: 94 MG/DL
HBV CORE IGG+IGM SER QL: NONREACTIVE
HBV CORE IGM SER QL: NONREACTIVE
HBV SURFACE AB SER QL: NONREACTIVE
HBV SURFACE AG SER QL: NONREACTIVE
HCT VFR BLD CALC: 38.7 %
HCV AB SER QL: NONREACTIVE
HCV S/CO RATIO: 0.06 S/CO
HDLC SERPL-MCNC: 69 MG/DL
HGB BLD-MCNC: 11.5 G/DL
IMM GRANULOCYTES NFR BLD AUTO: 0.2 %
LDLC SERPL CALC-MCNC: 57 MG/DL
LYMPHOCYTES # BLD AUTO: 6.95 K/UL
LYMPHOCYTES NFR BLD AUTO: 56 %
MAN DIFF?: NORMAL
MCHC RBC-ENTMCNC: 26.2 PG
MCHC RBC-ENTMCNC: 29.7 GM/DL
MCV RBC AUTO: 88.2 FL
MONOCYTES # BLD AUTO: 0.79 K/UL
MONOCYTES NFR BLD AUTO: 6.4 %
NEUTROPHILS # BLD AUTO: 4.21 K/UL
NEUTROPHILS NFR BLD AUTO: 33.9 %
NONHDLC SERPL-MCNC: 79 MG/DL
PLATELET # BLD AUTO: 169 K/UL
POTASSIUM SERPL-SCNC: 3.9 MMOL/L
PROT SERPL-MCNC: 6.3 G/DL
RBC # BLD: 4.39 M/UL
RBC # FLD: 14.8 %
SODIUM SERPL-SCNC: 144 MMOL/L
TRIGL SERPL-MCNC: 130 MG/DL
WBC # FLD AUTO: 12.42 K/UL

## 2024-10-28 PROCEDURE — 11104 PUNCH BX SKIN SINGLE LESION: CPT

## 2024-10-28 PROCEDURE — 11105 PUNCH BX SKIN EA SEP/ADDL: CPT

## 2024-10-28 PROCEDURE — 99215 OFFICE O/P EST HI 40 MIN: CPT | Mod: 25

## 2024-10-28 RX ORDER — PREDNISONE 10 MG/1
10 TABLET ORAL
Qty: 35 | Refills: 0 | Status: ACTIVE | COMMUNITY
Start: 2024-10-28 | End: 1900-01-01

## 2024-10-28 RX ORDER — TRIAMCINOLONE ACETONIDE 1 MG/G
0.1 OINTMENT TOPICAL
Qty: 1 | Refills: 0 | Status: ACTIVE | COMMUNITY
Start: 2024-10-28 | End: 1900-01-01

## 2024-10-30 LAB
M TB IFN-G BLD-IMP: NEGATIVE
QUANTIFERON TB PLUS MITOGEN MINUS NIL: 5.6 IU/ML
QUANTIFERON TB PLUS NIL: 0.02 IU/ML
QUANTIFERON TB PLUS TB1 MINUS NIL: 0.01 IU/ML
QUANTIFERON TB PLUS TB2 MINUS NIL: 0 IU/ML

## 2024-11-05 ENCOUNTER — APPOINTMENT (OUTPATIENT)
Dept: DERMATOLOGY | Facility: CLINIC | Age: 84
End: 2024-11-05

## 2024-11-05 VITALS — HEIGHT: 58 IN | BODY MASS INDEX: 29.39 KG/M2 | WEIGHT: 140 LBS

## 2024-11-05 PROCEDURE — G2211 COMPLEX E/M VISIT ADD ON: CPT

## 2024-11-05 PROCEDURE — 99215 OFFICE O/P EST HI 40 MIN: CPT

## 2024-11-05 RX ORDER — FOLIC ACID 1 MG/1
1 TABLET ORAL DAILY
Qty: 30 | Refills: 6 | Status: ACTIVE | COMMUNITY
Start: 2024-11-05 | End: 1900-01-01

## 2024-11-08 LAB — DERMATOLOGY BIOPSY: NORMAL

## 2024-11-12 ENCOUNTER — APPOINTMENT (OUTPATIENT)
Dept: DERMATOLOGY | Facility: CLINIC | Age: 84
End: 2024-11-12
Payer: MEDICARE

## 2024-11-12 ENCOUNTER — LABORATORY RESULT (OUTPATIENT)
Age: 84
End: 2024-11-12

## 2024-11-12 DIAGNOSIS — Z79.899 OTHER LONG TERM (CURRENT) DRUG THERAPY: ICD-10-CM

## 2024-11-12 DIAGNOSIS — L23.9 ALLERGIC CONTACT DERMATITIS, UNSPECIFIED CAUSE: ICD-10-CM

## 2024-11-12 PROCEDURE — G2211 COMPLEX E/M VISIT ADD ON: CPT

## 2024-11-12 PROCEDURE — 99214 OFFICE O/P EST MOD 30 MIN: CPT

## 2024-11-14 RX ORDER — METHOTREXATE 2.5 MG/1
2.5 TABLET ORAL
Qty: 24 | Refills: 0 | Status: ACTIVE | COMMUNITY
Start: 2024-11-05 | End: 1900-01-01

## 2024-11-17 LAB
ALBUMIN SERPL ELPH-MCNC: 4 G/DL
ALP BLD-CCNC: 68 U/L
ALT SERPL-CCNC: 11 U/L
ANION GAP SERPL CALC-SCNC: 9 MMOL/L
AST SERPL-CCNC: 15 U/L
BASOPHILS # BLD AUTO: 0.3 K/UL
BASOPHILS NFR BLD AUTO: 1.3 %
BILIRUB SERPL-MCNC: 0.3 MG/DL
BUN SERPL-MCNC: 23 MG/DL
CALCIUM SERPL-MCNC: 10.2 MG/DL
CHLORIDE SERPL-SCNC: 108 MMOL/L
CO2 SERPL-SCNC: 28 MMOL/L
CREAT SERPL-MCNC: 1.14 MG/DL
EGFR: 47 ML/MIN/1.73M2
EOSINOPHIL # BLD AUTO: 0.1 K/UL
EOSINOPHIL NFR BLD AUTO: 0.4 %
GLUCOSE SERPL-MCNC: 92 MG/DL
HCT VFR BLD CALC: 40.1 %
HGB BLD-MCNC: 12 G/DL
IMM GRANULOCYTES NFR BLD AUTO: 0.6 %
LYMPHOCYTES # BLD AUTO: 13.6 K/UL
LYMPHOCYTES NFR BLD AUTO: 57.2 %
MAN DIFF?: NORMAL
MCHC RBC-ENTMCNC: 26.5 PG
MCHC RBC-ENTMCNC: 29.9 G/DL
MCV RBC AUTO: 88.5 FL
MONOCYTES # BLD AUTO: 0.82 K/UL
MONOCYTES NFR BLD AUTO: 3.5 %
NEUTROPHILS # BLD AUTO: 8.8 K/UL
NEUTROPHILS NFR BLD AUTO: 37 %
PLATELET # BLD AUTO: 220 K/UL
POTASSIUM SERPL-SCNC: 4.8 MMOL/L
PROT SERPL-MCNC: 6.3 G/DL
RBC # BLD: 4.53 M/UL
RBC # FLD: 15 %
SODIUM SERPL-SCNC: 145 MMOL/L
WBC # FLD AUTO: 23.76 K/UL

## 2024-12-03 ENCOUNTER — APPOINTMENT (OUTPATIENT)
Dept: DERMATOLOGY | Facility: CLINIC | Age: 84
End: 2024-12-03
Payer: MEDICARE

## 2024-12-03 DIAGNOSIS — L30.9 DERMATITIS, UNSPECIFIED: ICD-10-CM

## 2024-12-03 PROCEDURE — 99214 OFFICE O/P EST MOD 30 MIN: CPT

## 2024-12-03 PROCEDURE — G2211 COMPLEX E/M VISIT ADD ON: CPT

## 2024-12-26 ENCOUNTER — APPOINTMENT (OUTPATIENT)
Dept: ORTHOPEDIC SURGERY | Facility: CLINIC | Age: 84
End: 2024-12-26
Payer: MEDICARE

## 2024-12-26 PROCEDURE — 73562 X-RAY EXAM OF KNEE 3: CPT | Mod: RT

## 2024-12-26 PROCEDURE — 99213 OFFICE O/P EST LOW 20 MIN: CPT | Mod: 25

## 2024-12-26 PROCEDURE — 20610 DRAIN/INJ JOINT/BURSA W/O US: CPT | Mod: RT

## 2025-01-06 ENCOUNTER — APPOINTMENT (OUTPATIENT)
Dept: ORTHOPEDIC SURGERY | Facility: CLINIC | Age: 85
End: 2025-01-06
Payer: MEDICARE

## 2025-01-06 VITALS — WEIGHT: 140 LBS | BODY MASS INDEX: 29.39 KG/M2 | HEIGHT: 58 IN

## 2025-01-06 PROCEDURE — 99213 OFFICE O/P EST LOW 20 MIN: CPT

## 2025-01-09 ENCOUNTER — APPOINTMENT (OUTPATIENT)
Dept: ORTHOPEDIC SURGERY | Facility: CLINIC | Age: 85
End: 2025-01-09
Payer: MEDICARE

## 2025-01-09 VITALS — HEIGHT: 58 IN | BODY MASS INDEX: 29.39 KG/M2 | WEIGHT: 140 LBS

## 2025-01-09 PROCEDURE — 99213 OFFICE O/P EST LOW 20 MIN: CPT

## 2025-01-14 ENCOUNTER — APPOINTMENT (OUTPATIENT)
Dept: DERMATOLOGY | Facility: CLINIC | Age: 85
End: 2025-01-14
Payer: MEDICARE

## 2025-01-14 PROCEDURE — 99214 OFFICE O/P EST MOD 30 MIN: CPT

## 2025-01-14 PROCEDURE — G2211 COMPLEX E/M VISIT ADD ON: CPT

## 2025-01-16 ENCOUNTER — APPOINTMENT (OUTPATIENT)
Dept: ORTHOPEDIC SURGERY | Facility: CLINIC | Age: 85
End: 2025-01-16
Payer: MEDICARE

## 2025-01-16 VITALS — HEIGHT: 58 IN | BODY MASS INDEX: 29.39 KG/M2 | WEIGHT: 140 LBS

## 2025-01-16 DIAGNOSIS — M25.461 EFFUSION, RIGHT KNEE: ICD-10-CM

## 2025-01-16 PROCEDURE — 99213 OFFICE O/P EST LOW 20 MIN: CPT | Mod: 25

## 2025-01-16 PROCEDURE — 20611 DRAIN/INJ JOINT/BURSA W/US: CPT | Mod: RT

## 2025-01-23 ENCOUNTER — APPOINTMENT (OUTPATIENT)
Dept: ORTHOPEDIC SURGERY | Facility: CLINIC | Age: 85
End: 2025-01-23

## 2025-01-23 VITALS — BODY MASS INDEX: 29.39 KG/M2 | HEIGHT: 58 IN | WEIGHT: 140 LBS

## 2025-01-23 PROCEDURE — 99212 OFFICE O/P EST SF 10 MIN: CPT | Mod: 25

## 2025-01-23 PROCEDURE — 20611 DRAIN/INJ JOINT/BURSA W/US: CPT | Mod: RT

## 2025-01-30 ENCOUNTER — APPOINTMENT (OUTPATIENT)
Dept: ORTHOPEDIC SURGERY | Facility: CLINIC | Age: 85
End: 2025-01-30
Payer: MEDICARE

## 2025-01-30 DIAGNOSIS — M17.11 UNILATERAL PRIMARY OSTEOARTHRITIS, RIGHT KNEE: ICD-10-CM

## 2025-01-30 PROCEDURE — 20611 DRAIN/INJ JOINT/BURSA W/US: CPT | Mod: RT

## 2025-01-30 PROCEDURE — 99024 POSTOP FOLLOW-UP VISIT: CPT

## 2025-03-27 ENCOUNTER — APPOINTMENT (OUTPATIENT)
Dept: ORTHOPEDIC SURGERY | Facility: CLINIC | Age: 85
End: 2025-03-27
Payer: MEDICARE

## 2025-03-27 DIAGNOSIS — Z90.49 ACQUIRED ABSENCE OF OTHER SPECIFIED PARTS OF DIGESTIVE TRACT: ICD-10-CM

## 2025-03-27 DIAGNOSIS — M17.11 UNILATERAL PRIMARY OSTEOARTHRITIS, RIGHT KNEE: ICD-10-CM

## 2025-03-27 PROCEDURE — 99213 OFFICE O/P EST LOW 20 MIN: CPT

## 2025-05-05 ENCOUNTER — APPOINTMENT (OUTPATIENT)
Dept: ORTHOPEDIC SURGERY | Facility: CLINIC | Age: 85
End: 2025-05-05
Payer: MEDICARE

## 2025-05-05 VITALS — HEIGHT: 58 IN | WEIGHT: 140 LBS | BODY MASS INDEX: 29.39 KG/M2

## 2025-05-05 DIAGNOSIS — Z98.1 ARTHRODESIS STATUS: ICD-10-CM

## 2025-05-05 DIAGNOSIS — M54.16 RADICULOPATHY, LUMBAR REGION: ICD-10-CM

## 2025-05-05 PROCEDURE — 72110 X-RAY EXAM L-2 SPINE 4/>VWS: CPT

## 2025-05-05 PROCEDURE — 99214 OFFICE O/P EST MOD 30 MIN: CPT

## 2025-05-05 PROCEDURE — 72170 X-RAY EXAM OF PELVIS: CPT

## 2025-05-27 ENCOUNTER — APPOINTMENT (OUTPATIENT)
Dept: ORTHOPEDIC SURGERY | Facility: CLINIC | Age: 85
End: 2025-05-27
Payer: MEDICARE

## 2025-05-27 VITALS — HEIGHT: 58 IN | WEIGHT: 140 LBS | BODY MASS INDEX: 29.39 KG/M2

## 2025-05-27 PROCEDURE — 99214 OFFICE O/P EST MOD 30 MIN: CPT

## 2025-06-05 ENCOUNTER — APPOINTMENT (OUTPATIENT)
Dept: PAIN MANAGEMENT | Facility: CLINIC | Age: 85
End: 2025-06-05
Payer: MEDICARE

## 2025-06-05 VITALS — WEIGHT: 140 LBS | HEIGHT: 58 IN | BODY MASS INDEX: 29.39 KG/M2

## 2025-06-05 DIAGNOSIS — M54.16 RADICULOPATHY, LUMBAR REGION: ICD-10-CM

## 2025-06-05 PROCEDURE — 99214 OFFICE O/P EST MOD 30 MIN: CPT

## 2025-06-20 ENCOUNTER — APPOINTMENT (OUTPATIENT)
Age: 85
End: 2025-06-20
Payer: MEDICARE

## 2025-06-20 PROCEDURE — 62323 NJX INTERLAMINAR LMBR/SAC: CPT

## 2025-06-20 RX ORDER — TRAMADOL HYDROCHLORIDE 50 MG/1
50 TABLET, COATED ORAL
Qty: 14 | Refills: 2 | Status: ACTIVE | COMMUNITY
Start: 2025-06-20 | End: 1900-01-01

## 2025-07-08 ENCOUNTER — APPOINTMENT (OUTPATIENT)
Dept: ORTHOPEDIC SURGERY | Facility: CLINIC | Age: 85
End: 2025-07-08
Payer: MEDICARE

## 2025-07-08 VITALS — HEIGHT: 58 IN | BODY MASS INDEX: 29.39 KG/M2 | WEIGHT: 140 LBS

## 2025-07-08 PROCEDURE — 99213 OFFICE O/P EST LOW 20 MIN: CPT

## 2025-07-10 ENCOUNTER — APPOINTMENT (OUTPATIENT)
Dept: PAIN MANAGEMENT | Facility: CLINIC | Age: 85
End: 2025-07-10
Payer: MEDICARE

## 2025-07-10 VITALS — BODY MASS INDEX: 29.39 KG/M2 | WEIGHT: 140 LBS | HEIGHT: 58 IN

## 2025-07-10 PROCEDURE — 99213 OFFICE O/P EST LOW 20 MIN: CPT

## (undated) DEVICE — SUT VICRYL 0 18" CT-1 UNDYED (POP-OFF)

## (undated) DEVICE — DRAPE C ARM 41X140"

## (undated) DEVICE — DRAPE INSTRUMENT POUCH 6.75" X 11"

## (undated) DEVICE — DRSG DERMABOND PRINEO 22CM

## (undated) DEVICE — DRAPE 3/4 SHEET W REINFORCEMENT 56X77"

## (undated) DEVICE — SOL IRR POUR NS 0.9% 1000ML

## (undated) DEVICE — BIPOLAR FORCEP HENSLER BAYONET 10" X 1MM (YELLOW)

## (undated) DEVICE — WARMING BLANKET UPPER ADULT

## (undated) DEVICE — MISONIX BONESCALPEL BLUNT BLADE & TUBESET 20MM

## (undated) DEVICE — DRSG 4 X 4" 4PLY STERILE

## (undated) DEVICE — DRAPE TOWEL BLUE 17" X 24"

## (undated) DEVICE — SUT VICRYL 1 18" CT-1 UNDYED (POP-OFF)

## (undated) DEVICE — FRA-ESU BOVIE FORCE TRIAD T6D04548DX: Type: DURABLE MEDICAL EQUIPMENT

## (undated) DEVICE — PROBE STIM MONOPOLAR

## (undated) DEVICE — DRSG XEROFORM 5 X 9"

## (undated) DEVICE — TAP RELINEO SOLID 5.5MM

## (undated) DEVICE — GLV 7.5 PROTEXIS (WHITE)

## (undated) DEVICE — TUBING BIPOLAR IRRIGATOR AND CORD SET

## (undated) DEVICE — Device

## (undated) DEVICE — SUT STRATAFIX SPIRAL MONOCRYL PLUS 4-0 45CM PS-2 UNDYED

## (undated) DEVICE — PREP DURAPREP 26CC

## (undated) DEVICE — FOLEY TRAY 14FR 5CC LF UMETER CLOSED

## (undated) DEVICE — GLV 8 PROTEXIS (BLUE)

## (undated) DEVICE — PACK POSTERIOR SPINAL FUSION

## (undated) DEVICE — ELCTR BOVIE TIP BLADE INSULATED 2.75" EDGE WITH SAFETY

## (undated) DEVICE — VENODYNE/SCD SLEEVE CALF MEDIUM

## (undated) DEVICE — FRAZIER SUCTION TIP 10FR

## (undated) DEVICE — DRAPE SHOWER CURTAIN ISOLATION

## (undated) DEVICE — SUT VICRYL 2-0 18" CP-2 UNDYED (POP-OFF)

## (undated) DEVICE — NDL SPINAL 18G X 3.5" (PINK)

## (undated) DEVICE — DRSG KERLIX ROLL 4.5"

## (undated) DEVICE — PACK DILATOR NV DISP M5

## (undated) DEVICE — MIDAS REX LEGEND MATCH HEAD FLUTED LG BORE 3.0MM X 14CM

## (undated) DEVICE — STRYKER BONE MILL BLADE MEDIUM 5.0MM

## (undated) DEVICE — DRSG TEGADERM 4X4.75"